# Patient Record
Sex: MALE | Race: WHITE | NOT HISPANIC OR LATINO
[De-identification: names, ages, dates, MRNs, and addresses within clinical notes are randomized per-mention and may not be internally consistent; named-entity substitution may affect disease eponyms.]

---

## 2018-11-28 ENCOUNTER — RECORD ABSTRACTING (OUTPATIENT)
Age: 63
End: 2018-11-28

## 2018-11-28 DIAGNOSIS — Z86.010 PERSONAL HISTORY OF COLONIC POLYPS: ICD-10-CM

## 2018-11-28 DIAGNOSIS — R74.8 ABNORMAL LEVELS OF OTHER SERUM ENZYMES: ICD-10-CM

## 2018-11-28 DIAGNOSIS — Z87.39 PERSONAL HISTORY OF OTHER DISEASES OF THE MUSCULOSKELETAL SYSTEM AND CONNECTIVE TISSUE: ICD-10-CM

## 2018-11-28 DIAGNOSIS — Z80.0 FAMILY HISTORY OF MALIGNANT NEOPLASM OF DIGESTIVE ORGANS: ICD-10-CM

## 2018-11-28 DIAGNOSIS — Z78.9 OTHER SPECIFIED HEALTH STATUS: ICD-10-CM

## 2018-11-28 DIAGNOSIS — Z82.49 FAMILY HISTORY OF ISCHEMIC HEART DISEASE AND OTHER DISEASES OF THE CIRCULATORY SYSTEM: ICD-10-CM

## 2018-11-28 DIAGNOSIS — Z86.19 PERSONAL HISTORY OF OTHER INFECTIOUS AND PARASITIC DISEASES: ICD-10-CM

## 2018-11-28 PROBLEM — Z00.00 ENCOUNTER FOR PREVENTIVE HEALTH EXAMINATION: Status: ACTIVE | Noted: 2018-11-28

## 2018-12-04 ENCOUNTER — APPOINTMENT (OUTPATIENT)
Dept: CARDIOLOGY | Facility: CLINIC | Age: 63
End: 2018-12-04
Payer: OTHER GOVERNMENT

## 2018-12-04 ENCOUNTER — NON-APPOINTMENT (OUTPATIENT)
Age: 63
End: 2018-12-04

## 2018-12-04 VITALS
SYSTOLIC BLOOD PRESSURE: 115 MMHG | BODY MASS INDEX: 28.88 KG/M2 | DIASTOLIC BLOOD PRESSURE: 70 MMHG | WEIGHT: 184 LBS | HEIGHT: 67 IN

## 2018-12-04 PROCEDURE — 93000 ELECTROCARDIOGRAM COMPLETE: CPT

## 2018-12-04 PROCEDURE — 99214 OFFICE O/P EST MOD 30 MIN: CPT

## 2018-12-04 NOTE — PHYSICAL EXAM
[General Appearance - Well Developed] : well developed [Normal Appearance] : normal appearance [Well Groomed] : well groomed [General Appearance - Well Nourished] : well nourished [No Deformities] : no deformities [General Appearance - In No Acute Distress] : no acute distress [Normal Conjunctiva] : the conjunctiva exhibited no abnormalities [Eyelids - No Xanthelasma] : the eyelids demonstrated no xanthelasmas [Normal Oral Mucosa] : normal oral mucosa [No Oral Pallor] : no oral pallor [No Oral Cyanosis] : no oral cyanosis [Normal Jugular Venous A Waves Present] : normal jugular venous A waves present [Normal Jugular Venous V Waves Present] : normal jugular venous V waves present [No Jugular Venous Jamison A Waves] : no jugular venous jamison A waves [Respiration, Rhythm And Depth] : normal respiratory rhythm and effort [Exaggerated Use Of Accessory Muscles For Inspiration] : no accessory muscle use [Auscultation Breath Sounds / Voice Sounds] : lungs were clear to auscultation bilaterally [Heart Rate And Rhythm] : heart rate and rhythm were normal [Heart Sounds] : normal S1 and S2 [Murmurs] : no murmurs present [Abdomen Soft] : soft [Abdomen Tenderness] : non-tender [Abdomen Mass (___ Cm)] : no abdominal mass palpated [Abnormal Walk] : normal gait [Gait - Sufficient For Exercise Testing] : the gait was sufficient for exercise testing [Nail Clubbing] : no clubbing of the fingernails [Cyanosis, Localized] : no localized cyanosis [Petechial Hemorrhages (___cm)] : no petechial hemorrhages [Skin Color & Pigmentation] : normal skin color and pigmentation [] : no rash [No Venous Stasis] : no venous stasis [Skin Lesions] : no skin lesions [No Skin Ulcers] : no skin ulcer [No Xanthoma] : no  xanthoma was observed [Oriented To Time, Place, And Person] : oriented to person, place, and time [Affect] : the affect was normal [Mood] : the mood was normal [No Anxiety] : not feeling anxious

## 2018-12-04 NOTE — HISTORY OF PRESENT ILLNESS
[FreeTextEntry1] : The patient has lately been experiencing symptoms of Lyme disease. These symptoms consist of a tingling sensation, fatigue, and occasional palpitations. There have been no symptoms of chest pain dizziness or lightheadedness. The patient is on an oral antibiotic prescribed by his primary care physician.

## 2018-12-04 NOTE — ASSESSMENT
[FreeTextEntry1] : The patient's examination today is normal. His electrocardiogram done today is normal. I have reviewed the EKG done recently at Dr. Alba office which I believe is also normal. In light of the patient's palpitations and diagnosis of Lyme disease I'm going to do a 24-hour Holter monitor to rule out arrhythmias and heart block. Current medications will be continued at this time.

## 2018-12-04 NOTE — REASON FOR VISIT
[FreeTextEntry1] : The patient was referred for evaluation by Dr. Adolfo DOSHI. He was recently found to have some abnormality on his EKG. Also the patient is undergoing treatment for recurrent Lyme disease. There was no recent rash or known tick bite. However the patient has had typical symptoms of the disease which he is familiar with him including tingling and joint pain muscle pain fatigue et cetera. His cardiac examination is normal today's electrocardiogram done here at my office was normal. The patient does complain of occasional palpitations. He has had this on a chronic basis but in order to rule out any arrhythmias I plan to do a 24-hour Holter monitor. We're also obtaining the previous EKG in question.

## 2020-02-27 ENCOUNTER — RESULT REVIEW (OUTPATIENT)
Age: 65
End: 2020-02-27

## 2020-02-28 ENCOUNTER — APPOINTMENT (OUTPATIENT)
Dept: GASTROENTEROLOGY | Facility: HOSPITAL | Age: 65
End: 2020-02-28

## 2021-01-08 ENCOUNTER — APPOINTMENT (OUTPATIENT)
Dept: CARDIOLOGY | Facility: CLINIC | Age: 66
End: 2021-01-08
Payer: OTHER GOVERNMENT

## 2021-01-08 ENCOUNTER — NON-APPOINTMENT (OUTPATIENT)
Age: 66
End: 2021-01-08

## 2021-01-08 VITALS
HEART RATE: 80 BPM | DIASTOLIC BLOOD PRESSURE: 80 MMHG | WEIGHT: 171 LBS | HEIGHT: 67 IN | BODY MASS INDEX: 26.84 KG/M2 | SYSTOLIC BLOOD PRESSURE: 124 MMHG

## 2021-01-08 PROCEDURE — 93000 ELECTROCARDIOGRAM COMPLETE: CPT

## 2021-01-08 PROCEDURE — 99072 ADDL SUPL MATRL&STAF TM PHE: CPT

## 2021-01-08 PROCEDURE — 99214 OFFICE O/P EST MOD 30 MIN: CPT

## 2021-01-08 RX ORDER — CETIRIZINE HYDROCHLORIDE 10 MG/1
10 TABLET, FILM COATED ORAL
Refills: 0 | Status: DISCONTINUED | COMMUNITY
End: 2021-01-08

## 2021-01-08 NOTE — REASON FOR VISIT
[FreeTextEntry1] : The patient experienced vague episode of extreme vertigo and October of 2020. He underwent a very thorough neurologic evaluation which revealed no evidence of infarction and no evidence of vascular obstruction. The symptoms have subsided. Patient does also complain of tinnitus. He is tearful and positioning his head so as to avoid symptoms.

## 2021-01-08 NOTE — HISTORY OF PRESENT ILLNESS
[FreeTextEntry1] : The patient's blood pressure was elevated at the time of the neural neurologic incident and has remained somewhat elevated it afterward. The patient was given a therapy including amlodipine but this lately caused a reaction and edema. I elected to switch the patient from amlodipine to losartan 25 mg daily.

## 2021-01-08 NOTE — DISCUSSION/SUMMARY
[FreeTextEntry1] : The patient's exam today is normal his electrocardiogram is normal. Recent labs were reviewed. Recent neurologic symptom of vertigo may have been a vestibular disorder rather than a central nervous system infarction. I am recommending that the patient evaluate this further at the [facility]. I believe his cardiac status is stable at the present time. We will continue to follow the patient's progress.

## 2021-08-31 ENCOUNTER — APPOINTMENT (OUTPATIENT)
Dept: GASTROENTEROLOGY | Facility: CLINIC | Age: 66
End: 2021-08-31
Payer: OTHER GOVERNMENT

## 2021-08-31 ENCOUNTER — NON-APPOINTMENT (OUTPATIENT)
Age: 66
End: 2021-08-31

## 2021-08-31 VITALS
DIASTOLIC BLOOD PRESSURE: 72 MMHG | SYSTOLIC BLOOD PRESSURE: 128 MMHG | HEIGHT: 67 IN | BODY MASS INDEX: 27.94 KG/M2 | HEART RATE: 72 BPM | TEMPERATURE: 97 F | WEIGHT: 178 LBS

## 2021-08-31 PROCEDURE — 99072 ADDL SUPL MATRL&STAF TM PHE: CPT

## 2021-08-31 PROCEDURE — 99214 OFFICE O/P EST MOD 30 MIN: CPT

## 2021-08-31 RX ORDER — METOPROLOL TARTRATE AND HYDROCHLOROTHIAZIDE 50; 25 MG/1; MG/1
50-25 TABLET ORAL DAILY
Refills: 0 | Status: DISCONTINUED | COMMUNITY
End: 2021-08-31

## 2021-08-31 RX ORDER — LOSARTAN POTASSIUM 25 MG/1
25 TABLET, FILM COATED ORAL
Qty: 90 | Refills: 3 | Status: DISCONTINUED | COMMUNITY
Start: 2021-01-07 | End: 2021-08-31

## 2021-08-31 NOTE — HISTORY OF PRESENT ILLNESS
[FreeTextEntry1] : 1.  patient, who had COVID in Feb 2021, left her with Bruxism, and perhaps some clicking when he swallows\par \par but his main symptoms bringing him into the office include;\par a.  upper abdominal burning, radiating into chest,\par patient has not responded to a number of antisecretory...\par tried zegerid, tried large amount of gaviscon\par \par avoids esophageal irritants, spicy foods, acidic foods, and there is sensitivity to a wide variety of esophageal irritants.\par \par he uses local gaviscon, and gaviscon advance...\par \par patient hasn’t found any particular regimen that is most helpful\par \par bed at 10 degreessx \par \par sx are worse after he bends over..\par \par and he can regurgitate at night\par \par barking cough for six weeks after covid, and then since, a clicking motion or sound when he swallows, and when he moves the hyoid bone to the right, there is a click\par \par

## 2021-08-31 NOTE — ASSESSMENT
[FreeTextEntry1] : 1.  gastroesophageal reflux, classic, \par \par 2.  family history of colon cancer\par \par brother had metastatic colon cancer\par \par 3.  history of adenomatous polyp, sigmoid colon, some five years ago\par \par plan\par needs a colonoscopy\par consider an egd as well\par patient is having perineal pain extending into left posterior upper thigh\par \par has not used ppi therapy regularly\par \par since June\par \par also, has the pharyngeal concerns, related to COVID, perhaps LPR\par and quite a bit of coughing\par \par with rectal bleed, anal pain, fh of colon can ,his brother, and tubular adenoma five years ago at last colonoscopy, the performance of a colonoscopy is essential\par \par i would add egd as well with the above symptoms, especially since this is a time when you are not using ppi therapy\par \par patient needs to have a bone density study, for ppi use off and on for many years\par \par osteoporosis is the most common side effect of long term ppi use\par \par also, yearly or semi annual bun and creatinine should be done\par \par More than 50% of the face to face time was devoted to counseling and /or coordination of care.  THis coordination of care may have included reviewing other medical notes and reports, and communicating with other health professionals\par \par AS WE OBTAIN INFORMED CONSENT FOR COLONOSCOPY, UPPER ENDOSCOPY [EGD], OR BOTH PROCEDURES TOGETHER;\par \par As with all procedures, there are risks of which the patient should be aware\par \par 1.  Anesthesia; deep sedation with Propofol;  there is a small risk of aspiration and pulmonary infection.  The Anesthesiologist meets with the patient the morning of the procedure to discuss in more detail\par \par 2.  risk of bleeding; from removal of a polyp, or rarely, from biopsies, 1 % or less\par \par 3.  risk of injury or perforation of the colon or upper GI tract; one in a thousand or less,  from removing a polyp or from advancing the instrument\par \par \par \par

## 2021-09-05 ENCOUNTER — RESULT REVIEW (OUTPATIENT)
Age: 66
End: 2021-09-05

## 2021-09-07 ENCOUNTER — RESULT REVIEW (OUTPATIENT)
Age: 66
End: 2021-09-07

## 2021-09-08 ENCOUNTER — APPOINTMENT (OUTPATIENT)
Dept: GASTROENTEROLOGY | Facility: HOSPITAL | Age: 66
End: 2021-09-08

## 2021-09-08 ENCOUNTER — NON-APPOINTMENT (OUTPATIENT)
Age: 66
End: 2021-09-08

## 2021-12-15 ENCOUNTER — TRANSCRIPTION ENCOUNTER (OUTPATIENT)
Age: 66
End: 2021-12-15

## 2022-01-27 RX ORDER — CETIRIZINE HCL 10 MG
10 TABLET ORAL
Refills: 0 | Status: ACTIVE | COMMUNITY

## 2022-01-27 RX ORDER — ALUMINUM HYDROXIDE AND MAGNESIUM CARBONATE 254; 237.5 MG/5ML; MG/5ML
254-237.5 LIQUID ORAL
Refills: 0 | Status: ACTIVE | COMMUNITY
Start: 2022-01-27

## 2022-01-27 RX ORDER — CALCIUM CARBONATE 1000 MG/1
1000 TABLET, CHEWABLE ORAL
Refills: 0 | Status: ACTIVE | COMMUNITY
Start: 2022-01-27

## 2022-02-23 NOTE — REASON FOR VISIT
[FreeTextEntry1] : Edwin Interiano presents for follow up visit. he was started on amlodipine 5mg daily one month ago. He reports

## 2022-02-25 ENCOUNTER — APPOINTMENT (OUTPATIENT)
Dept: CARDIOLOGY | Facility: CLINIC | Age: 67
End: 2022-02-25

## 2022-03-15 ENCOUNTER — APPOINTMENT (OUTPATIENT)
Dept: GASTROENTEROLOGY | Facility: CLINIC | Age: 67
End: 2022-03-15
Payer: OTHER GOVERNMENT

## 2022-03-15 PROCEDURE — 99443: CPT

## 2022-03-15 NOTE — HISTORY OF PRESENT ILLNESS
[FreeTextEntry1] : audio visit, consent on file , telephonic, his wife is on call\par patient at home, i am in my [place] office\par \par 1.  esophageal reflux\par he has been avoiding his PPI therapy, as per our discussion\par He did have a bone density, reported as normal\par his bp has been higher\par he did have an MRI of the abdomen, which showed some fatty change in liver, but lfts are normal, and the reading is mild steatosis\par \par meanwhile, his reflux regimen;\par \par no famotidine\par no ppi\par gaviscon and antacids\par he has some regurgitation if he bends over after a meal\par cant go to bed within a couple hours of eating\par \par no sensitivity to esophageal irritants..\par tomato sauce can cause some symptoms;  peppers and onion cause some gas, and heartburn\par caffeine..avoided, avoids alcohol, allergic o sulfites\par \par he does have heartburn two or three times per week\par he successfully lost weight, about twenty one pounds the last yeat\par \par p

## 2022-03-15 NOTE — ASSESSMENT
[FreeTextEntry1] : 1.  re mild steatosis, with nl lft's, not a great source of concern for me right now;  suggest every six months or so, just checking the liver chemistries\par 2.  yes, i do believe we can render the reflux better..how about trying pantoprazole every other morning, for the next six months\par and on the off days, a famotidine, in the morning\par gaviscon liberally when needed\par 3.  fu with dr Manuel re elevation of bp, and getting bp under control..and lipid management if the diet fails to get lipids under control\par \par rv in six months, or another visit like this\par \par AS WE OBTAIN INFORMED CONSENT FOR COLONOSCOPY, UPPER ENDOSCOPY [EGD], OR BOTH PROCEDURES TOGETHER;\par \par As with all procedures, there are risks of which the patient should be aware\par \par 1.  Anesthesia; deep sedation with Propofol;  there is a small risk of aspiration and pulmonary infection.  The Anesthesiologist meets with the patient the morning of the procedure to discuss in more detail\par \par 2.  risk of bleeding; from removal of a polyp, or rarely, from biopsies, 1 % or less\par \par 3.  risk of injury or perforation of the colon or upper GI tract; one in a thousand or less,  from removing a polyp or from advancing the instrument\par \par \par

## 2022-03-22 ENCOUNTER — NON-APPOINTMENT (OUTPATIENT)
Age: 67
End: 2022-03-22

## 2022-03-22 ENCOUNTER — APPOINTMENT (OUTPATIENT)
Dept: CARDIOLOGY | Facility: CLINIC | Age: 67
End: 2022-03-22
Payer: OTHER GOVERNMENT

## 2022-03-22 VITALS
BODY MASS INDEX: 28.09 KG/M2 | DIASTOLIC BLOOD PRESSURE: 76 MMHG | HEIGHT: 67 IN | SYSTOLIC BLOOD PRESSURE: 145 MMHG | WEIGHT: 179 LBS | TEMPERATURE: 97 F

## 2022-03-22 PROCEDURE — 99072 ADDL SUPL MATRL&STAF TM PHE: CPT

## 2022-03-22 PROCEDURE — 99214 OFFICE O/P EST MOD 30 MIN: CPT

## 2022-03-22 PROCEDURE — 93000 ELECTROCARDIOGRAM COMPLETE: CPT

## 2022-03-22 RX ORDER — IBUPROFEN 200 MG/1
200 TABLET ORAL
Refills: 0 | Status: DISCONTINUED | COMMUNITY
End: 2022-03-22

## 2022-03-22 NOTE — DISCUSSION/SUMMARY
[FreeTextEntry1] : The patient has had a number of recent medical issues.  He developed an elevation in blood pressure and some degree of tachycardia several months ago for unclear reasons.  He has been undergoing a very extensive evaluation including a series of MRIs and blood test etc.  The test so far have failed to disclose a cause of this problem however a test of interest is still pending this is the urine analysis for VMA and metanephrines.  There was no evidence of an adrenal tumor on MRI of the abdomen.  He was advised to have another MRI looking for a pituitary adenoma however I recommended that the patient should consult with an endocrinologist before this is performed.  His lipid profile was not optimal with an elevated LDL and non-HDL cholesterol therefore I am planning to prescribe rosuvastatin 10 mg at this time.  The patient's blood pressure has been under better control with an adjustment in his medications.  Diet and exercise would also be advisable.  The electrocardiography reveals sinus rhythm and is within normal limits.\par Plan to do a treadmill test in several months.

## 2022-03-22 NOTE — REASON FOR VISIT
[FreeTextEntry1] : Patient returns for follow-up today.  He has been followed with a diagnosis of hypertension and several months ago he noticed a spike in his blood pressure which was treated with additional antihypertensive medications.  At around the same time he noted symptoms of a prominent tremor especially involving the hands.  Patient consulted a neurologist who orchestrated a very extensive work-up including a several blood tests as well as MRI imaging studies.  There was a concern about a possible pituitary adenoma causing the symptoms however this diagnosis could not be confirmed on the MRIs due to technical limitations.  Another MRI of the abdomen revealed mild hepatic steatosis and no evidence of an adrenal mass.  The patient's lipid profile is as follows cholesterol 224 HDL 57 triglycerides 120  non-.  Remainder of the chemistry and CBC are normal.  Sh 1.63 ammonia 24 ESR 18 result a.m. 16.3 Free T4 1.0

## 2022-06-28 ENCOUNTER — APPOINTMENT (OUTPATIENT)
Dept: CARDIOLOGY | Facility: CLINIC | Age: 67
End: 2022-06-28
Payer: OTHER GOVERNMENT

## 2022-06-28 PROCEDURE — 99213 OFFICE O/P EST LOW 20 MIN: CPT | Mod: 25

## 2022-06-28 PROCEDURE — 93015 CV STRESS TEST SUPVJ I&R: CPT

## 2022-06-28 PROCEDURE — 99072 ADDL SUPL MATRL&STAF TM PHE: CPT

## 2022-06-28 RX ORDER — AMLODIPINE BESYLATE 5 MG/1
5 TABLET ORAL
Qty: 90 | Refills: 3 | Status: DISCONTINUED | COMMUNITY
Start: 2022-01-27 | End: 2022-06-28

## 2022-06-28 NOTE — REASON FOR VISIT
[FreeTextEntry1] : Patient comes for follow-up including treadmill testing.  He had adverse reactions to both amlodipine and losartan which caused edema and a rash respectively.\par And also we were able to reduce the metoprolol to 25 mg daily.  Even with this reduced regimen the patient's blood pressure readings have come under very good control typical reading is about 110/70.\par Patient has had no symptoms of chest pain shortness of breath or palpitations and in fact he walks up to 8000 steps on a given day.

## 2022-06-28 NOTE — DISCUSSION/SUMMARY
[FreeTextEntry1] : The patient's clinical condition is stable.  Blood pressure has come under very good control with a reduced regimen.\par Also the patient is benefiting from an increase program of walking exercise.  Today's treadmill test revealed no evidence of exercise-induced myocardial ischemia or arrhythmias and the patient was easily able to complete Boogie stage II.  I believe his cardiovascular status is stable I plan to continue the current regimen.

## 2022-11-21 ENCOUNTER — NON-APPOINTMENT (OUTPATIENT)
Age: 67
End: 2022-11-21

## 2022-11-21 RX ORDER — METOPROLOL SUCCINATE 25 MG/1
25 TABLET, EXTENDED RELEASE ORAL DAILY
Qty: 90 | Refills: 3 | Status: DISCONTINUED | COMMUNITY
Start: 2022-06-28 | End: 2022-11-21

## 2023-01-31 ENCOUNTER — NON-APPOINTMENT (OUTPATIENT)
Age: 68
End: 2023-01-31

## 2023-01-31 ENCOUNTER — APPOINTMENT (OUTPATIENT)
Dept: CARDIOLOGY | Facility: CLINIC | Age: 68
End: 2023-01-31
Payer: OTHER GOVERNMENT

## 2023-01-31 VITALS
OXYGEN SATURATION: 98 % | WEIGHT: 176 LBS | BODY MASS INDEX: 27.62 KG/M2 | SYSTOLIC BLOOD PRESSURE: 128 MMHG | HEART RATE: 70 BPM | TEMPERATURE: 97.8 F | HEIGHT: 67 IN | DIASTOLIC BLOOD PRESSURE: 76 MMHG

## 2023-01-31 PROCEDURE — 99072 ADDL SUPL MATRL&STAF TM PHE: CPT

## 2023-01-31 PROCEDURE — 93000 ELECTROCARDIOGRAM COMPLETE: CPT

## 2023-01-31 PROCEDURE — 99214 OFFICE O/P EST MOD 30 MIN: CPT

## 2023-01-31 RX ORDER — HYDROXYZINE PAMOATE 50 MG/1
50 CAPSULE ORAL
Qty: 120 | Refills: 0 | Status: DISCONTINUED | COMMUNITY
Start: 2022-06-18 | End: 2023-01-31

## 2023-01-31 RX ORDER — TRAMADOL HYDROCHLORIDE 300 MG/1
TABLET, EXTENDED RELEASE ORAL
Refills: 0 | Status: DISCONTINUED | COMMUNITY
End: 2023-01-31

## 2023-01-31 RX ORDER — HYDROCODONE BITARTRATE AND ACETAMINOPHEN 7.5; 3 MG/1; MG/1
TABLET ORAL
Refills: 0 | Status: DISCONTINUED | COMMUNITY
End: 2023-01-31

## 2023-01-31 NOTE — REVIEW OF SYSTEMS
[Fever] : no fever [Headache] : no headache [Chills] : no chills [Feeling Fatigued] : not feeling fatigued [SOB] : no shortness of breath [Chest Discomfort] : no chest discomfort [Lower Ext Edema] : no extremity edema [Palpitations] : no palpitations [Syncope] : no syncope [Dizziness] : no dizziness [Confusion] : no confusion was observed

## 2023-01-31 NOTE — REASON FOR VISIT
[Spouse] : spouse [FreeTextEntry1] : Edwin Interiano presents for follow up visit. \par \par Recent [facility] ED presentation on 1/25/23 with c/o elevated blood pressure and tachycardia. He was watching tv and felt warm. He checked his blood pressure, 180/100 and HR was 140. He took a 1/2 tablet of metoprolol succinate 50mg. HR did not improve and he presented to [facility]. \par \par Work up at [facility] - labs show no acute findings, ECG demonstrate sinus rhythm. he was discharged with instructions to follow up with cardiology. \par \par He was recently diagnosed with autonomic neuropathy by neurology, following with Dr. Rodriguez. He is being evaluated for hypoglycemia by endocrinologist Dr. Noble. He is wearing a glucometer. \par \par Mr. Interiano reports that his home blood pressure and heart rate have been within normal limits. He denies chest pain, SOB.

## 2023-01-31 NOTE — CARDIOLOGY SUMMARY
[de-identified] : 1/31/23 Sinus rhythm HR 71 [de-identified] : 12/4/2018 24hour Holter. Sinus rhythm. Average 74bpm. Single 4-beat SVT.  [de-identified] : 6/28/22 No ischemia. Boogie 6:41 minutes. 7 METS. 92% MPHR

## 2023-01-31 NOTE — DISCUSSION/SUMMARY
[Patient] : the patient [With ___] : with [unfilled] [EKG obtained to assist in diagnosis and management of assessed problem(s)] : EKG obtained to assist in diagnosis and management of assessed problem(s)

## 2023-01-31 NOTE — PHYSICAL EXAM
[No Acute Distress] : no acute distress [Normal S1, S2] : normal S1, S2 [No Murmur] : no murmur [Clear Lung Fields] : clear lung fields [Good Air Entry] : good air entry [No Respiratory Distress] : no respiratory distress  [Normal Gait] : normal gait [No Edema] : no edema [No Rash] : no rash [Moves all extremities] : moves all extremities [No Focal Deficits] : no focal deficits [Normal Speech] : normal speech [Alert and Oriented] : alert and oriented [Normal memory] : normal memory

## 2023-02-01 ENCOUNTER — RX CHANGE (OUTPATIENT)
Age: 68
End: 2023-02-01

## 2023-02-03 ENCOUNTER — RX CHANGE (OUTPATIENT)
Age: 68
End: 2023-02-03

## 2023-03-16 ENCOUNTER — NON-APPOINTMENT (OUTPATIENT)
Age: 68
End: 2023-03-16

## 2023-03-16 ENCOUNTER — APPOINTMENT (OUTPATIENT)
Dept: CARDIOLOGY | Facility: CLINIC | Age: 68
End: 2023-03-16
Payer: OTHER GOVERNMENT

## 2023-03-16 VITALS
SYSTOLIC BLOOD PRESSURE: 124 MMHG | DIASTOLIC BLOOD PRESSURE: 70 MMHG | HEIGHT: 67 IN | WEIGHT: 175 LBS | BODY MASS INDEX: 27.47 KG/M2

## 2023-03-16 PROCEDURE — 99072 ADDL SUPL MATRL&STAF TM PHE: CPT

## 2023-03-16 PROCEDURE — 99214 OFFICE O/P EST MOD 30 MIN: CPT

## 2023-03-16 PROCEDURE — 93000 ELECTROCARDIOGRAM COMPLETE: CPT

## 2023-03-16 NOTE — REVIEW OF SYSTEMS
[Negative] : Heme/Lymph [de-identified] : Patient has a diagnosis of autonomic neuropathy.  He is presently undergoing a repeat extensive evaluation by 2 different neurologists Dr. Rodriguez and Dr. Brown she will

## 2023-03-16 NOTE — REASON FOR VISIT
[FreeTextEntry1] : The patient returns for follow-up today.  Patient is being evaluated extensively for autonomic neuropathy.  This has resulted in a labile form of hypertension\par \par Also he is being evaluated by an endocrinologist because of tendency to symptoms of hypoglycemia he now has a indwelling glucose monitor the readings are generally in the normal range well above 60 however he describes symptoms that occur when the readings drop even into the upper 90s or 80s.  I am not sure of the cause of those symptoms but we will continue to evaluate this with his endocrinologist.  Lab tests were done earlier today.

## 2023-04-21 ENCOUNTER — APPOINTMENT (OUTPATIENT)
Dept: HEMATOLOGY ONCOLOGY | Facility: CLINIC | Age: 68
End: 2023-04-21
Payer: OTHER GOVERNMENT

## 2023-04-21 ENCOUNTER — RESULT REVIEW (OUTPATIENT)
Age: 68
End: 2023-04-21

## 2023-04-21 VITALS
WEIGHT: 185.19 LBS | OXYGEN SATURATION: 97 % | RESPIRATION RATE: 17 BRPM | SYSTOLIC BLOOD PRESSURE: 137 MMHG | HEIGHT: 67 IN | DIASTOLIC BLOOD PRESSURE: 75 MMHG | TEMPERATURE: 85 F | BODY MASS INDEX: 29.07 KG/M2 | HEART RATE: 78 BPM

## 2023-04-21 PROCEDURE — 99072 ADDL SUPL MATRL&STAF TM PHE: CPT

## 2023-04-21 PROCEDURE — 36415 COLL VENOUS BLD VENIPUNCTURE: CPT

## 2023-04-21 PROCEDURE — 99205 OFFICE O/P NEW HI 60 MIN: CPT | Mod: 25

## 2023-04-21 RX ORDER — FAMOTIDINE 20 MG/1
20 TABLET, FILM COATED ORAL
Qty: 90 | Refills: 3 | Status: COMPLETED | COMMUNITY
Start: 2022-03-15 | End: 2023-04-21

## 2023-04-21 RX ORDER — PANTOPRAZOLE 40 MG/1
40 TABLET, DELAYED RELEASE ORAL DAILY
Qty: 60 | Refills: 5 | Status: COMPLETED | COMMUNITY
Start: 2022-03-15 | End: 2023-04-21

## 2023-04-30 NOTE — HISTORY OF PRESENT ILLNESS
[de-identified] : Mr.Joseph Interiano is a 68 year old male who presents to the office for r/o amyloidosis \par \par Past hx: lyme disease, elevated liver enzymes, vertigo, covid, hypoglycemia ( dx), HTN, BPH, autonomous neuropathy\par Last labs from 23: WBC 9.98, neut%: 82, lymph 8.7\par PET scan performed on 3/23/23 to evaluate for an insulinoma or neural endocrine tumor was performed revealed the results to be negative. \par A follow up CT of the abdomen and pelvis on 4/3/23 to r/o amyloidosis was performed which was also negative at the time. \par \par Patient states that he has a hx of premature atrial contractions neither blood pressure and glucose are in directly control and he states which is due to autonomous neuropathy which was dx was .\par Patient had an EMG performed by  3-3 weeks ago and he was r/o for muscle dystrophy, ALS, no MS, Parkinson and myasthenia gravis\par He went to an endocrinologist who tested him on 2023 for c-peptide, cortisol-AM, insulin, metanephrines, proinsulin, T3, TSH, T4 Thyroid antibodies which states were all normal and negative. \par \par Social hx:\par Smoker: none\par ETOH: none \par Illicit Drugs: none\par Work: Retired  \par \par \par Family Hx:\par Brother: colon cancer dx 71, secondary to colon ca \par Father: heart attack  55, smoker. p Aunt and Uncle - , unknown age\par Paternal GF - unknown. \par Mother: COPD/emphysema (smoker) -  age 80\par MGF -  age 40 liver failure- ETOH \par Health maintenance:\par CNY/EGD: \par Prostate exam: \par

## 2023-04-30 NOTE — REVIEW OF SYSTEMS
[Night Sweats] : night sweats [Fatigue] : fatigue [Dry Eyes] : dryness of the eyes [Vision Problems] : vision problems [Muscle Weakness] : muscle weakness [Negative] : Allergic/Immunologic [FreeTextEntry3] : damage retina right eye  [de-identified] : vertigo

## 2023-04-30 NOTE — REASON FOR VISIT
[Initial Consultation] : an initial consultation [Spouse] : spouse [FreeTextEntry2] : evaluation for amyloidosis
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
## 2023-05-15 ENCOUNTER — APPOINTMENT (OUTPATIENT)
Dept: HEMATOLOGY ONCOLOGY | Facility: CLINIC | Age: 68
End: 2023-05-15
Payer: OTHER GOVERNMENT

## 2023-05-15 PROCEDURE — 99214 OFFICE O/P EST MOD 30 MIN: CPT | Mod: 95

## 2023-05-15 RX ORDER — LANCETS 30 GAUGE
EACH MISCELLANEOUS
Qty: 300 | Refills: 0 | Status: ACTIVE | COMMUNITY
Start: 2023-01-26

## 2023-05-15 RX ORDER — GLUCAGON INJECTION, SOLUTION 0.5 MG/.1ML
0.5 INJECTION, SOLUTION SUBCUTANEOUS
Qty: 1 | Refills: 0 | Status: ACTIVE | COMMUNITY
Start: 2023-02-22
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
## 2023-05-15 NOTE — REASON FOR VISIT
[Home] : at home, [unfilled] , at the time of the visit. [Medical Office: ([facility])___] : at the medical office located in  [Patient] : the patient [Self] : self [Spouse] : spouse [Initial Consultation] : an initial consultation [FreeTextEntry2] : evaluation for amyloidosis

## 2023-05-15 NOTE — REVIEW OF SYSTEMS
[Night Sweats] : night sweats [Fatigue] : fatigue [Dry Eyes] : dryness of the eyes [Vision Problems] : vision problems [Muscle Weakness] : muscle weakness [Negative] : Allergic/Immunologic [FreeTextEntry3] : damage retina right eye  [de-identified] : vertigo

## 2023-05-15 NOTE — HISTORY OF PRESENT ILLNESS
[de-identified] : Mr.Joseph Interiano is a 68 year old male who presents to the office for r/o amyloidosis \par \par Past hx: lyme disease, elevated liver enzymes, vertigo, covid, hypoglycemia ( dx), HTN, BPH, autonomous neuropathy\par Last labs from 23: WBC 9.98, neut%: 82, lymph 8.7\par PET scan performed on 3/23/23 to evaluate for an insulinoma or neural endocrine tumor was performed revealed the results to be negative. \par A follow up CT of the abdomen and pelvis on 4/3/23 to r/o amyloidosis was performed which was also negative at the time. \par \par Patient states that he has a hx of premature atrial contractions neither blood pressure and glucose are in directly control and he states which is due to autonomous neuropathy which was dx was .\par Patient had an EMG performed by  3-3 weeks ago and he was r/o for muscle dystrophy, ALS, no MS, Parkinson and myasthenia gravis\par He went to an endocrinologist who tested him on 2023 for c-peptide, cortisol-AM, insulin, metanephrines, proinsulin, T3, TSH, T4 Thyroid antibodies which states were all normal and negative. \par \par Social hx:\par Smoker: none\par ETOH: none \par Illicit Drugs: none\par Work: Retired  \par \par \par Family Hx:\par Brother: colon cancer dx 71, secondary to colon ca \par Father: heart attack  55, smoker. p Aunt and Uncle - , unknown age\par Paternal GF - unknown. \par Mother: COPD/emphysema (smoker) -  age 80\par MGF -  age 40 liver failure- ETOH \par Health maintenance:\par CNY/EGD: \par Prostate exam: \par

## 2023-06-22 ENCOUNTER — RESULT REVIEW (OUTPATIENT)
Age: 68
End: 2023-06-22

## 2023-08-01 ENCOUNTER — APPOINTMENT (OUTPATIENT)
Dept: SURGICAL ONCOLOGY | Facility: CLINIC | Age: 68
End: 2023-08-01
Payer: OTHER GOVERNMENT

## 2023-08-01 VITALS
RESPIRATION RATE: 16 BRPM | HEIGHT: 67 IN | DIASTOLIC BLOOD PRESSURE: 85 MMHG | HEART RATE: 76 BPM | WEIGHT: 185.8 LBS | SYSTOLIC BLOOD PRESSURE: 142 MMHG | BODY MASS INDEX: 29.16 KG/M2 | OXYGEN SATURATION: 97 %

## 2023-08-01 PROCEDURE — 99244 OFF/OP CNSLTJ NEW/EST MOD 40: CPT

## 2023-08-02 NOTE — HISTORY OF PRESENT ILLNESS
[de-identified] : Mr. Interiano is a 68 y.o. man who in the last few months has developed a number of symptoms suggestive of autonomic neuropathy, and most recently erratic blood pressure and blood glucose values. Dr. Culp believes that amyloidosis should be ruled out.  DOTATATE PET scan on 3/23/23 and a CT of the abdomen and pelvis on 4/3/23 have been negative for NET and amyloidosis, respectively. At this point a fat pad biopsy is necessary to r/o amyloidosis.

## 2023-08-02 NOTE — ASSESSMENT
[FreeTextEntry1] : Mr. Inteirano is a 68 y.o. man with autonomic neuropathy, possibly result of amyloidosis.  Will set up a fat pad biopsy tentatively for Aug 8. Discussed with Dr. Culp.

## 2023-08-02 NOTE — REVIEW OF SYSTEMS
[Negative] : Heme/Lymph [FreeTextEntry2] : chills, dizziness, sweats [FreeTextEntry5] : high BP, low BP, irregular heart beats (PAC), rapid heart beat [FreeTextEntry8] : bloating, bowel changes, gas, hemorrhoids, indigestion, nausea, heartburn [de-identified] : change in moles

## 2023-08-02 NOTE — REASON FOR VISIT
[Initial Consultation] : an initial consultation for [Spouse] : spouse [FreeTextEntry2] : autonomic neuropathy

## 2023-08-04 ENCOUNTER — RESULT REVIEW (OUTPATIENT)
Age: 68
End: 2023-08-04

## 2023-09-20 ENCOUNTER — APPOINTMENT (OUTPATIENT)
Dept: SURGERY | Facility: CLINIC | Age: 68
End: 2023-09-20
Payer: OTHER GOVERNMENT

## 2023-09-20 VITALS
HEIGHT: 67 IN | DIASTOLIC BLOOD PRESSURE: 85 MMHG | HEART RATE: 73 BPM | OXYGEN SATURATION: 97 % | SYSTOLIC BLOOD PRESSURE: 145 MMHG | TEMPERATURE: 98.4 F | BODY MASS INDEX: 29.03 KG/M2 | WEIGHT: 185 LBS

## 2023-09-20 PROCEDURE — 99213 OFFICE O/P EST LOW 20 MIN: CPT

## 2023-09-26 ENCOUNTER — NON-APPOINTMENT (OUTPATIENT)
Age: 68
End: 2023-09-26

## 2023-09-26 ENCOUNTER — APPOINTMENT (OUTPATIENT)
Dept: CARDIOLOGY | Facility: CLINIC | Age: 68
End: 2023-09-26
Payer: OTHER GOVERNMENT

## 2023-09-26 VITALS
OXYGEN SATURATION: 98 % | HEART RATE: 80 BPM | SYSTOLIC BLOOD PRESSURE: 128 MMHG | DIASTOLIC BLOOD PRESSURE: 72 MMHG | HEIGHT: 67 IN

## 2023-09-26 PROCEDURE — 99213 OFFICE O/P EST LOW 20 MIN: CPT | Mod: 25

## 2023-09-26 PROCEDURE — 93000 ELECTROCARDIOGRAM COMPLETE: CPT

## 2023-10-02 ENCOUNTER — RESULT REVIEW (OUTPATIENT)
Age: 68
End: 2023-10-02

## 2023-10-03 ENCOUNTER — APPOINTMENT (OUTPATIENT)
Dept: SURGERY | Facility: HOSPITAL | Age: 68
End: 2023-10-03

## 2023-10-03 ENCOUNTER — TRANSCRIPTION ENCOUNTER (OUTPATIENT)
Age: 68
End: 2023-10-03

## 2023-10-18 ENCOUNTER — APPOINTMENT (OUTPATIENT)
Dept: SURGERY | Facility: CLINIC | Age: 68
End: 2023-10-18
Payer: OTHER GOVERNMENT

## 2023-10-18 ENCOUNTER — APPOINTMENT (OUTPATIENT)
Dept: ENDOCRINOLOGY | Facility: CLINIC | Age: 68
End: 2023-10-18
Payer: OTHER GOVERNMENT

## 2023-10-18 VITALS
OXYGEN SATURATION: 96 % | SYSTOLIC BLOOD PRESSURE: 123 MMHG | HEART RATE: 64 BPM | TEMPERATURE: 98.3 F | DIASTOLIC BLOOD PRESSURE: 78 MMHG

## 2023-10-18 VITALS
HEIGHT: 67 IN | SYSTOLIC BLOOD PRESSURE: 116 MMHG | WEIGHT: 185 LBS | BODY MASS INDEX: 29.03 KG/M2 | OXYGEN SATURATION: 96 % | DIASTOLIC BLOOD PRESSURE: 70 MMHG | HEART RATE: 80 BPM

## 2023-10-18 PROCEDURE — 99024 POSTOP FOLLOW-UP VISIT: CPT

## 2023-10-18 PROCEDURE — 99204 OFFICE O/P NEW MOD 45 MIN: CPT

## 2023-10-18 RX ORDER — DIBASIC SODIUM PHOSPHATE, MONOBASIC POTASSIUM PHOSPHATE AND MONOBASIC SODIUM PHOSPHATE 852; 155; 130 MG/1; MG/1; MG/1
155-852-130 TABLET ORAL
Qty: 14 | Refills: 4 | Status: DISCONTINUED | COMMUNITY
Start: 2023-04-26 | End: 2023-10-18

## 2023-10-18 RX ORDER — METOPROLOL SUCCINATE 50 MG/1
50 TABLET, EXTENDED RELEASE ORAL TWICE DAILY
Qty: 90 | Refills: 3 | Status: DISCONTINUED | COMMUNITY
End: 2023-10-18

## 2023-10-18 RX ORDER — ALPRAZOLAM 0.5 MG/1
0.5 TABLET ORAL
Qty: 60 | Refills: 0 | Status: DISCONTINUED | COMMUNITY
Start: 2022-11-30 | End: 2023-10-18

## 2023-11-07 ENCOUNTER — RESULT REVIEW (OUTPATIENT)
Age: 68
End: 2023-11-07

## 2023-11-17 ENCOUNTER — RESULT REVIEW (OUTPATIENT)
Age: 68
End: 2023-11-17

## 2023-12-12 ENCOUNTER — APPOINTMENT (OUTPATIENT)
Dept: ENDOCRINOLOGY | Facility: CLINIC | Age: 68
End: 2023-12-12
Payer: OTHER GOVERNMENT

## 2023-12-12 VITALS
SYSTOLIC BLOOD PRESSURE: 118 MMHG | BODY MASS INDEX: 29.82 KG/M2 | DIASTOLIC BLOOD PRESSURE: 70 MMHG | HEIGHT: 67 IN | HEART RATE: 70 BPM | OXYGEN SATURATION: 98 % | WEIGHT: 190 LBS

## 2023-12-12 PROCEDURE — 99215 OFFICE O/P EST HI 40 MIN: CPT

## 2023-12-15 NOTE — HISTORY OF PRESENT ILLNESS
[FreeTextEntry1] : Dec 12, 2023    in person  PCP:  Dr. Adolfo Bailey           GI:  Dr. Darrel Fernandez           Neurology:  Dr. Russel Rodriguez            H/O:  Dr. Robbi Mireles:  Dr. Kg Manuel            Endo:  Dr. Stevenson            Derm:  Dr. James  - lesion on back  CC:   Abnormality of glucose tolerance with symptoms of hypoglycemia              4/21/23 phosphorus 2.1   67 yo visits because of symptoms of hypoglycemia.   He has had extensive, through evaluation for several issues and Dr. Stevenson has arranged for him to monitor his sugars with input from Sina (currently Libre3) which shows spikes in sugars after meals over 200 mg/dl on occasion and he develops symptoms of low sugar during the downward trajectory of sugar. He also underwent a formal 2 hour OGTT and developed symptoms at the 3rd hour.     He has a long history of autonomic neuropathy and gets light headed when he stands up.   He also has had episodes of hypertension and has been followed by Neurology and Cardiology.  Because of numbness and tingling in has hands and other symptoms, he is currently having the possibility of amyloidosis evaluated.  Imp:  Data on Sina 3 reviewed and confirms early diabetes. Plan:    Very low carb diet. It took a long time to convince him and his wife that croissants are not a part of a very low carb diet. His wife is very interested as to what to do if his sugars drop even on the very low carb diet.   Call me Sunday with data.    Oct 18, 2023   First visit - in person - accompanied by his wife (who reports she has DM)  PCP:  Dr. Adolfo Bailey           GI:  Dr. Darrel Fernandez           Neurology:  Dr. Russel Rodriguez            H/O:  Dr. Culp            Card:  Dr. Kg Guzman:  Dr. Stevenson            Derm:  Dr. James  - lesion on back  CC:   Abnormality of glucose tolerance with symptoms of hypoglycemia              4/21/23 phosphorus 2.1   67 yo visits because of symptoms of hypoglycemia.   He has had extensive, through evaluation for several issues and Dr. Stevenson has arranged for him to monitor his sugars with input from Sina (currently Libre3) which shows spikes in sugars after meals over 200 mg/dl on occasion and he develops symptoms of low sugar during the downward trajectory of sugar. He also underwent a formal 2 hour OGTT and developed symptoms at the 3rd hour.     He has a long history of autonomic neuropathy and gets light headed when he stands up.   He also has had episodes of hypertension and has been followed by Neurology and Cardiology.  Because of numbness and tingling in has hands and other symptoms, he is currently having the possibility of amyloidosis evaluated.  : : Constitutional:  Alert, well nourished, healthy appearance, no acute distress  Eyes:  No proptosis, no stare Thyroid: normal to palpation Pulmonary:  No respiratory distress, no accessory muscle use; normal rate and effort Cardiac:  Normal rate Vascular:  Endocrine:  No stigmata of Cushings Syndrome Musculoskeletal:  Normal gait, no involuntary movements Neurology:  No tremors Affect/Mood/Psych:  Oriented x 3; normal affect, normal insight/judgement, normal mood  .  Impression:  Patient kindly brought with him a careful outline of his recent history, symptoms and evaluation.  He has been evaluated by  Dermatology, GI, Neurology, Hematology, Cardiology and Endocrinology.   While he has a long history of apparent autonomic neuropathy, it is the symptoms of hypoglycemia that makes him eat, gain weight, and is of most concern to him.      Plan:  He has addressed the hypoglycemia with frequent small feedings and that has been associated with weight gain.   Another approach to hypoglycemia, especially if there is a reactive componenet is a very low carbohydrate diet and /or addition of medications such as Precose.  There appears to be a component of insulin resistance and medications to address this may also be useful. He has plans to follow up to Dermatology for a skin lesion on his back as well as GI.  He has an ongoing evaluation of possible amyloidosis in progress.       I have asked him to have overnight fasting insulin levels at [place] and a repeat 24 hour urine for catecholamines.    He will call me about two weeks after he completes these.

## 2024-01-09 ENCOUNTER — APPOINTMENT (OUTPATIENT)
Dept: ENDOCRINOLOGY | Facility: CLINIC | Age: 69
End: 2024-01-09
Payer: OTHER GOVERNMENT

## 2024-01-09 VITALS
BODY MASS INDEX: 28.56 KG/M2 | HEIGHT: 67 IN | SYSTOLIC BLOOD PRESSURE: 114 MMHG | WEIGHT: 182 LBS | OXYGEN SATURATION: 95 % | DIASTOLIC BLOOD PRESSURE: 62 MMHG | HEART RATE: 81 BPM

## 2024-01-09 DIAGNOSIS — Z86.39 PERSONAL HISTORY OF OTHER ENDOCRINE, NUTRITIONAL AND METABOLIC DISEASE: ICD-10-CM

## 2024-01-09 PROCEDURE — G2211 COMPLEX E/M VISIT ADD ON: CPT

## 2024-01-09 PROCEDURE — 99215 OFFICE O/P EST HI 40 MIN: CPT

## 2024-01-29 ENCOUNTER — NON-APPOINTMENT (OUTPATIENT)
Age: 69
End: 2024-01-29

## 2024-01-29 ENCOUNTER — APPOINTMENT (OUTPATIENT)
Dept: CARDIOLOGY | Facility: CLINIC | Age: 69
End: 2024-01-29
Payer: COMMERCIAL

## 2024-01-29 VITALS
BODY MASS INDEX: 28.93 KG/M2 | TEMPERATURE: 97.8 F | HEIGHT: 67 IN | HEART RATE: 72 BPM | RESPIRATION RATE: 18 BRPM | OXYGEN SATURATION: 98 % | SYSTOLIC BLOOD PRESSURE: 122 MMHG | WEIGHT: 184.31 LBS | DIASTOLIC BLOOD PRESSURE: 84 MMHG

## 2024-01-29 VITALS — SYSTOLIC BLOOD PRESSURE: 132 MMHG | DIASTOLIC BLOOD PRESSURE: 82 MMHG

## 2024-01-29 VITALS — DIASTOLIC BLOOD PRESSURE: 76 MMHG | SYSTOLIC BLOOD PRESSURE: 124 MMHG

## 2024-01-29 DIAGNOSIS — A69.20 LYME DISEASE, UNSPECIFIED: ICD-10-CM

## 2024-01-29 DIAGNOSIS — R42 DIZZINESS AND GIDDINESS: ICD-10-CM

## 2024-01-29 DIAGNOSIS — R19.7 DIARRHEA, UNSPECIFIED: ICD-10-CM

## 2024-01-29 DIAGNOSIS — Z86.16 PERSONAL HISTORY OF COVID-19: ICD-10-CM

## 2024-01-29 DIAGNOSIS — R00.2 PALPITATIONS: ICD-10-CM

## 2024-01-29 DIAGNOSIS — R79.89 OTHER SPECIFIED ABNORMAL FINDINGS OF BLOOD CHEMISTRY: ICD-10-CM

## 2024-01-29 DIAGNOSIS — Z86.2 PERSONAL HISTORY OF DISEASES OF THE BLOOD AND BLOOD-FORMING ORGANS AND CERTAIN DISORDERS INVOLVING THE IMMUNE MECHANISM: ICD-10-CM

## 2024-01-29 DIAGNOSIS — K76.0 FATTY (CHANGE OF) LIVER, NOT ELSEWHERE CLASSIFIED: ICD-10-CM

## 2024-01-29 DIAGNOSIS — E83.39 OTHER DISORDERS OF PHOSPHORUS METABOLISM: ICD-10-CM

## 2024-01-29 DIAGNOSIS — Z86.79 PERSONAL HISTORY OF OTHER DISEASES OF THE CIRCULATORY SYSTEM: ICD-10-CM

## 2024-01-29 DIAGNOSIS — Z86.39 PERSONAL HISTORY OF OTHER ENDOCRINE, NUTRITIONAL AND METABOLIC DISEASE: ICD-10-CM

## 2024-01-29 DIAGNOSIS — Z01.818 ENCOUNTER FOR OTHER PREPROCEDURAL EXAMINATION: ICD-10-CM

## 2024-01-29 DIAGNOSIS — K21.9 GASTRO-ESOPHAGEAL REFLUX DISEASE W/OUT ESOPHAGITIS: ICD-10-CM

## 2024-01-29 DIAGNOSIS — R97.20 ELEVATED PROSTATE, SPECIFIC ANTIGEN [PSA]: ICD-10-CM

## 2024-01-29 DIAGNOSIS — D86.3 SARCOIDOSIS OF SKIN: ICD-10-CM

## 2024-01-29 DIAGNOSIS — Z87.19 PERSONAL HISTORY OF OTHER DISEASES OF THE DIGESTIVE SYSTEM: ICD-10-CM

## 2024-01-29 DIAGNOSIS — K59.09 OTHER CONSTIPATION: ICD-10-CM

## 2024-01-29 DIAGNOSIS — I49.1 ATRIAL PREMATURE DEPOLARIZATION: ICD-10-CM

## 2024-01-29 DIAGNOSIS — E53.8 DEFICIENCY OF OTHER SPECIFIED B GROUP VITAMINS: ICD-10-CM

## 2024-01-29 LAB
ANION GAP SERPL CALC-SCNC: 11 MMOL/L
ANION GAP SERPL CALC-SCNC: 14 MMOL/L
BUN SERPL-MCNC: 12 MG/DL
BUN SERPL-MCNC: 12 MG/DL
C PEPTIDE SERPL-MCNC: 6.1 NG/ML
C PEPTIDE SERPL-MCNC: 6.3 NG/ML
CALCIUM SERPL-MCNC: 9.2 MG/DL
CALCIUM SERPL-MCNC: 9.5 MG/DL
CHLORIDE SERPL-SCNC: 103 MMOL/L
CHLORIDE SERPL-SCNC: 104 MMOL/L
CO2 SERPL-SCNC: 24 MMOL/L
CO2 SERPL-SCNC: 26 MMOL/L
CREAT SERPL-MCNC: 1 MG/DL
CREAT SERPL-MCNC: 1.1 MG/DL
EGFR: 73 ML/MIN/1.73M2
EGFR: 81 ML/MIN/1.73M2
GLUCOSE SERPL-MCNC: 102 MG/DL
GLUCOSE SERPL-MCNC: 119 MG/DL
INSULIN SERPL-MCNC: 39 UU/ML
INSULIN SERPL-MCNC: 42.2 UU/ML
POTASSIUM SERPL-SCNC: 4.5 MMOL/L
POTASSIUM SERPL-SCNC: 4.6 MMOL/L
PROINSULIN SERPL-MCNC: 14.7 PMOL/L
PROINSULIN SERPL-MCNC: 15.3 PMOL/L
SODIUM SERPL-SCNC: 140 MMOL/L
SODIUM SERPL-SCNC: 142 MMOL/L

## 2024-01-29 PROCEDURE — 99205 OFFICE O/P NEW HI 60 MIN: CPT | Mod: 25

## 2024-01-29 PROCEDURE — 93246 EXT ECG>7D<15D RECORDING: CPT

## 2024-01-29 PROCEDURE — 93000 ELECTROCARDIOGRAM COMPLETE: CPT | Mod: 59

## 2024-01-29 RX ORDER — ACARBOSE 25 MG/1
25 TABLET ORAL
Qty: 90 | Refills: 3 | Status: DISCONTINUED | COMMUNITY
Start: 2024-01-09 | End: 2024-01-29

## 2024-02-22 ENCOUNTER — LABORATORY RESULT (OUTPATIENT)
Age: 69
End: 2024-02-22

## 2024-02-22 ENCOUNTER — APPOINTMENT (OUTPATIENT)
Dept: FAMILY MEDICINE | Facility: CLINIC | Age: 69
End: 2024-02-22
Payer: OTHER GOVERNMENT

## 2024-02-22 VITALS
SYSTOLIC BLOOD PRESSURE: 120 MMHG | HEART RATE: 74 BPM | HEIGHT: 67 IN | OXYGEN SATURATION: 98 % | WEIGHT: 180 LBS | BODY MASS INDEX: 28.25 KG/M2 | DIASTOLIC BLOOD PRESSURE: 70 MMHG

## 2024-02-22 DIAGNOSIS — R53.83 OTHER FATIGUE: ICD-10-CM

## 2024-02-22 PROCEDURE — 36415 COLL VENOUS BLD VENIPUNCTURE: CPT

## 2024-02-22 PROCEDURE — 99203 OFFICE O/P NEW LOW 30 MIN: CPT

## 2024-02-22 PROCEDURE — G2211 COMPLEX E/M VISIT ADD ON: CPT

## 2024-02-26 PROCEDURE — 93248 EXT ECG>7D<15D REV&INTERPJ: CPT

## 2024-03-06 ENCOUNTER — MED ADMIN CHARGE (OUTPATIENT)
Age: 69
End: 2024-03-06

## 2024-03-06 ENCOUNTER — APPOINTMENT (OUTPATIENT)
Dept: INTERNAL MEDICINE | Facility: CLINIC | Age: 69
End: 2024-03-06
Payer: OTHER GOVERNMENT

## 2024-03-06 PROCEDURE — 96372 THER/PROPH/DIAG INJ SC/IM: CPT

## 2024-03-06 PROCEDURE — 36415 COLL VENOUS BLD VENIPUNCTURE: CPT

## 2024-03-06 RX ORDER — CYANOCOBALAMIN 1000 UG/ML
1000 INJECTION INTRAMUSCULAR; SUBCUTANEOUS
Qty: 0 | Refills: 0 | Status: COMPLETED | OUTPATIENT
Start: 2024-03-06

## 2024-03-10 ENCOUNTER — RESULT REVIEW (OUTPATIENT)
Age: 69
End: 2024-03-10

## 2024-03-11 DIAGNOSIS — R93.1 ABNORMAL FINDINGS ON DIAGNOSTIC IMAGING OF HEART AND CORONARY CIRCULATION: ICD-10-CM

## 2024-03-11 RX ORDER — ROSUVASTATIN CALCIUM 10 MG/1
10 TABLET, FILM COATED ORAL
Qty: 90 | Refills: 3 | Status: DISCONTINUED | COMMUNITY
Start: 2022-03-22 | End: 2023-01-31

## 2024-03-11 RX ADMIN — CYANOCOBALAMIN 0 MCG/ML: 1000 INJECTION, SOLUTION INTRAMUSCULAR at 00:00

## 2024-03-13 ENCOUNTER — APPOINTMENT (OUTPATIENT)
Dept: INTERNAL MEDICINE | Facility: CLINIC | Age: 69
End: 2024-03-13
Payer: COMMERCIAL

## 2024-03-13 PROCEDURE — 96372 THER/PROPH/DIAG INJ SC/IM: CPT

## 2024-03-13 RX ORDER — CYANOCOBALAMIN 1000 UG/ML
1000 INJECTION INTRAMUSCULAR; SUBCUTANEOUS
Qty: 0 | Refills: 0 | Status: COMPLETED | OUTPATIENT
Start: 2024-03-11

## 2024-03-17 RX ORDER — BLOOD SUGAR DIAGNOSTIC
STRIP MISCELLANEOUS
Qty: 100 | Refills: 11 | Status: ACTIVE | COMMUNITY
Start: 2023-04-24 | End: 1900-01-01

## 2024-03-20 ENCOUNTER — APPOINTMENT (OUTPATIENT)
Dept: INTERNAL MEDICINE | Facility: CLINIC | Age: 69
End: 2024-03-20
Payer: COMMERCIAL

## 2024-03-20 PROCEDURE — 96372 THER/PROPH/DIAG INJ SC/IM: CPT

## 2024-03-20 RX ORDER — CYANOCOBALAMIN 1000 UG/ML
1000 INJECTION INTRAMUSCULAR; SUBCUTANEOUS
Qty: 0 | Refills: 0 | Status: COMPLETED | OUTPATIENT
Start: 2024-03-14

## 2024-03-27 ENCOUNTER — APPOINTMENT (OUTPATIENT)
Dept: INTERNAL MEDICINE | Facility: CLINIC | Age: 69
End: 2024-03-27
Payer: OTHER GOVERNMENT

## 2024-03-27 PROCEDURE — 96372 THER/PROPH/DIAG INJ SC/IM: CPT

## 2024-03-27 RX ORDER — CYANOCOBALAMIN 1000 UG/ML
1000 INJECTION INTRAMUSCULAR; SUBCUTANEOUS
Qty: 0 | Refills: 0 | Status: COMPLETED | OUTPATIENT
Start: 2024-03-21

## 2024-04-02 ENCOUNTER — APPOINTMENT (OUTPATIENT)
Dept: CARDIOLOGY | Facility: CLINIC | Age: 69
End: 2024-04-02

## 2024-04-11 ENCOUNTER — APPOINTMENT (OUTPATIENT)
Dept: CARDIOLOGY | Facility: CLINIC | Age: 69
End: 2024-04-11

## 2024-04-12 ENCOUNTER — NON-APPOINTMENT (OUTPATIENT)
Age: 69
End: 2024-04-12

## 2024-04-24 ENCOUNTER — APPOINTMENT (OUTPATIENT)
Dept: INTERNAL MEDICINE | Facility: CLINIC | Age: 69
End: 2024-04-24
Payer: COMMERCIAL

## 2024-04-24 PROCEDURE — 96372 THER/PROPH/DIAG INJ SC/IM: CPT

## 2024-04-24 RX ORDER — CYANOCOBALAMIN 1000 UG/ML
1000 INJECTION INTRAMUSCULAR; SUBCUTANEOUS
Qty: 0 | Refills: 0 | Status: COMPLETED | OUTPATIENT
Start: 2024-04-23

## 2024-04-25 LAB
25(OH)D3 SERPL-MCNC: 19.2 NG/ML
ALBUMIN SERPL ELPH-MCNC: 4.3 G/DL
ALDOLASE SERPL-CCNC: 4.5 U/L
ALP BLD-CCNC: 117 U/L
ALT SERPL-CCNC: 19 U/L
ANA SER IF-ACNC: NEGATIVE
ANION GAP SERPL CALC-SCNC: 15 MMOL/L
AST SERPL-CCNC: 19 U/L
BASOPHILS # BLD AUTO: 0.04 K/UL
BASOPHILS NFR BLD AUTO: 0.4 %
BILIRUB SERPL-MCNC: 0.5 MG/DL
BUN SERPL-MCNC: 12 MG/DL
CALCIUM SERPL-MCNC: 9.2 MG/DL
CCP AB SER IA-ACNC: <8 UNITS
CELIACPAN: NORMAL
CHLORIDE SERPL-SCNC: 101 MMOL/L
CK SERPL-CCNC: 128 U/L
CO2 SERPL-SCNC: 25 MMOL/L
CREAT SERPL-MCNC: 0.91 MG/DL
CRP SERPL-MCNC: 9 MG/L
DSDNA AB SER-ACNC: <12 IU/ML
EGFR: 91 ML/MIN/1.73M2
EOSINOPHIL # BLD AUTO: 0.25 K/UL
EOSINOPHIL NFR BLD AUTO: 2.7 %
ERYTHROCYTE [SEDIMENTATION RATE] IN BLOOD BY WESTERGREN METHOD: 37 MM/HR
ESTIMATED AVERAGE GLUCOSE: 134 MG/DL
FERRITIN SERPL-MCNC: 444 NG/ML
FOLATE SERPL-MCNC: 11 NG/ML
FOLATE SERPL-MCNC: 14 NG/ML
FOLATE SERPL-MCNC: 14.2 NG/ML
GGT SERPL-CCNC: 211 U/L
GLUCOSE SERPL-MCNC: 89 MG/DL
HBA1C MFR BLD HPLC: 6.3 %
HCT VFR BLD CALC: 45.8 %
HCYS SERPL-MCNC: 15.3 UMOL/L
HGB BLD-MCNC: 15.4 G/DL
IF BLOCK AB SER QL: 1.1 AU/ML
IMM GRANULOCYTES NFR BLD AUTO: 0.3 %
IRON SATN MFR SERPL: 40 %
IRON SERPL-MCNC: 119 UG/DL
LDH SERPL-CCNC: 180 U/L
LYMPHOCYTES # BLD AUTO: 1.92 K/UL
LYMPHOCYTES NFR BLD AUTO: 20.9 %
MAN DIFF?: NORMAL
MCHC RBC-ENTMCNC: 31.6 PG
MCHC RBC-ENTMCNC: 33.6 GM/DL
MCV RBC AUTO: 94 FL
METHYLMALONATE SERPL-SCNC: 205 NMOL/L
MONOCYTES # BLD AUTO: 0.98 K/UL
MONOCYTES NFR BLD AUTO: 10.7 %
NEUTROPHILS # BLD AUTO: 5.98 K/UL
NEUTROPHILS NFR BLD AUTO: 65 %
PLATELET # BLD AUTO: 267 K/UL
POTASSIUM SERPL-SCNC: 4.1 MMOL/L
PROT SERPL-MCNC: 7 G/DL
RBC # BLD: 4.87 M/UL
RBC # FLD: 13.4 %
RF+CCP IGG SER-IMP: NEGATIVE
RHEUMATOID FACT SER QL: <10 IU/ML
SODIUM SERPL-SCNC: 141 MMOL/L
TIBC SERPL-MCNC: 295 UG/DL
TSH SERPL-ACNC: 1.54 UIU/ML
UIBC SERPL-MCNC: 177 UG/DL
VIT B12 SERPL-MCNC: 229 PG/ML
VIT B12 SERPL-MCNC: 263 PG/ML
VIT B12 SERPL-MCNC: 529 PG/ML
WBC # FLD AUTO: 9.2 K/UL

## 2024-05-10 ENCOUNTER — APPOINTMENT (OUTPATIENT)
Dept: CARDIOLOGY | Facility: CLINIC | Age: 69
End: 2024-05-10

## 2024-05-10 ENCOUNTER — RX CHANGE (OUTPATIENT)
Age: 69
End: 2024-05-10

## 2024-05-20 RX ORDER — ROSUVASTATIN CALCIUM 10 MG/1
10 TABLET, FILM COATED ORAL
Qty: 90 | Refills: 3 | Status: COMPLETED | COMMUNITY
Start: 2024-03-11 | End: 2024-05-20

## 2024-05-20 RX ORDER — METOPROLOL TARTRATE 25 MG/1
25 TABLET, FILM COATED ORAL TWICE DAILY
Qty: 180 | Refills: 3 | Status: ACTIVE | COMMUNITY
Start: 2023-01-31 | End: 1900-01-01

## 2024-05-22 ENCOUNTER — APPOINTMENT (OUTPATIENT)
Dept: INTERNAL MEDICINE | Facility: CLINIC | Age: 69
End: 2024-05-22
Payer: COMMERCIAL

## 2024-05-22 PROCEDURE — 96372 THER/PROPH/DIAG INJ SC/IM: CPT

## 2024-05-22 RX ORDER — CYANOCOBALAMIN 1000 UG/ML
1000 INJECTION INTRAMUSCULAR; SUBCUTANEOUS
Qty: 0 | Refills: 0 | Status: COMPLETED | OUTPATIENT
Start: 2024-05-19

## 2024-05-27 PROBLEM — R53.83 FATIGUE: Status: ACTIVE | Noted: 2024-02-22

## 2024-05-27 NOTE — HEALTH RISK ASSESSMENT
[No] : No [No falls in past year] : Patient reported no falls in the past year [0] : 2) Feeling down, depressed, or hopeless: Not at all (0) [Never] : Never [[id number]] : 0

## 2024-06-20 ENCOUNTER — APPOINTMENT (OUTPATIENT)
Dept: FAMILY MEDICINE | Facility: CLINIC | Age: 69
End: 2024-06-20
Payer: OTHER GOVERNMENT

## 2024-06-20 VITALS
TEMPERATURE: 99.2 F | WEIGHT: 184 LBS | HEIGHT: 67 IN | BODY MASS INDEX: 28.88 KG/M2 | DIASTOLIC BLOOD PRESSURE: 70 MMHG | SYSTOLIC BLOOD PRESSURE: 140 MMHG | HEART RATE: 82 BPM | OXYGEN SATURATION: 96 %

## 2024-06-20 DIAGNOSIS — E16.2 HYPOGLYCEMIA, UNSPECIFIED: ICD-10-CM

## 2024-06-20 DIAGNOSIS — E55.9 VITAMIN D DEFICIENCY, UNSPECIFIED: ICD-10-CM

## 2024-06-20 DIAGNOSIS — E53.8 DEFICIENCY OF OTHER SPECIFIED B GROUP VITAMINS: ICD-10-CM

## 2024-06-20 DIAGNOSIS — I10 ESSENTIAL (PRIMARY) HYPERTENSION: ICD-10-CM

## 2024-06-20 DIAGNOSIS — E11.9 TYPE 2 DIABETES MELLITUS W/OUT COMPLICATIONS: ICD-10-CM

## 2024-06-20 DIAGNOSIS — G90.9 DISORDER OF THE AUTONOMIC NERVOUS SYSTEM, UNSPECIFIED: ICD-10-CM

## 2024-06-20 DIAGNOSIS — E78.5 HYPERLIPIDEMIA, UNSPECIFIED: ICD-10-CM

## 2024-06-20 PROCEDURE — G2211 COMPLEX E/M VISIT ADD ON: CPT

## 2024-06-20 PROCEDURE — 99214 OFFICE O/P EST MOD 30 MIN: CPT

## 2024-06-20 PROCEDURE — 36415 COLL VENOUS BLD VENIPUNCTURE: CPT

## 2024-06-20 RX ORDER — BLOOD-GLUCOSE SENSOR
EACH MISCELLANEOUS
Qty: 3 | Refills: 6 | Status: DISCONTINUED | COMMUNITY
Start: 2024-03-10 | End: 2024-06-20

## 2024-06-20 RX ORDER — ROSUVASTATIN CALCIUM 5 MG/1
5 TABLET, FILM COATED ORAL DAILY
Qty: 30 | Refills: 1 | Status: DISCONTINUED | COMMUNITY
Start: 2024-05-20 | End: 2024-06-20

## 2024-06-20 RX ORDER — BLOOD-GLUCOSE,RECEIVER,CONT
EACH MISCELLANEOUS
Qty: 1 | Refills: 0 | Status: DISCONTINUED | COMMUNITY
Start: 2024-03-10 | End: 2024-06-20

## 2024-06-20 RX ORDER — FLASH GLUCOSE SENSOR
KIT MISCELLANEOUS
Qty: 6 | Refills: 0 | Status: DISCONTINUED | COMMUNITY
Start: 2023-02-22 | End: 2024-06-20

## 2024-06-21 ENCOUNTER — APPOINTMENT (OUTPATIENT)
Dept: ORTHOPEDIC SURGERY | Facility: CLINIC | Age: 69
End: 2024-06-21
Payer: COMMERCIAL

## 2024-06-21 ENCOUNTER — RESULT REVIEW (OUTPATIENT)
Age: 69
End: 2024-06-21

## 2024-06-21 VITALS — HEIGHT: 67 IN | WEIGHT: 184 LBS | BODY MASS INDEX: 28.88 KG/M2

## 2024-06-21 DIAGNOSIS — M17.0 BILATERAL PRIMARY OSTEOARTHRITIS OF KNEE: ICD-10-CM

## 2024-06-21 DIAGNOSIS — S83.241A OTHER TEAR OF MEDIAL MENISCUS, CURRENT INJURY, RIGHT KNEE, INITIAL ENCOUNTER: ICD-10-CM

## 2024-06-21 LAB
25(OH)D3 SERPL-MCNC: 24.2 NG/ML
ALBUMIN SERPL ELPH-MCNC: 4.4 G/DL
ALP BLD-CCNC: 133 U/L
ALT SERPL-CCNC: 21 U/L
ANION GAP SERPL CALC-SCNC: 13 MMOL/L
AST SERPL-CCNC: 25 U/L
BASOPHILS # BLD AUTO: 0.04 K/UL
BASOPHILS NFR BLD AUTO: 0.4 %
BILIRUB SERPL-MCNC: 0.5 MG/DL
BUN SERPL-MCNC: 15 MG/DL
CALCIUM SERPL-MCNC: 9.4 MG/DL
CHLORIDE SERPL-SCNC: 102 MMOL/L
CO2 SERPL-SCNC: 24 MMOL/L
CREAT SERPL-MCNC: 1.04 MG/DL
EGFR: 77 ML/MIN/1.73M2
EOSINOPHIL # BLD AUTO: 0.24 K/UL
EOSINOPHIL NFR BLD AUTO: 2.6 %
ESTIMATED AVERAGE GLUCOSE: 131 MG/DL
FOLATE SERPL-MCNC: 13.5 NG/ML
GLUCOSE SERPL-MCNC: 95 MG/DL
HBA1C MFR BLD HPLC: 6.2 %
HCT VFR BLD CALC: 46.7 %
HCYS SERPL-MCNC: 13.7 UMOL/L
HGB BLD-MCNC: 15.8 G/DL
IMM GRANULOCYTES NFR BLD AUTO: 0.3 %
IRON SATN MFR SERPL: 44 %
IRON SERPL-MCNC: 136 UG/DL
LYMPHOCYTES # BLD AUTO: 1.96 K/UL
LYMPHOCYTES NFR BLD AUTO: 21 %
MAN DIFF?: NORMAL
MCHC RBC-ENTMCNC: 31.7 PG
MCHC RBC-ENTMCNC: 33.8 GM/DL
MCV RBC AUTO: 93.6 FL
MONOCYTES # BLD AUTO: 1.04 K/UL
MONOCYTES NFR BLD AUTO: 11.1 %
NEUTROPHILS # BLD AUTO: 6.03 K/UL
NEUTROPHILS NFR BLD AUTO: 64.6 %
PLATELET # BLD AUTO: 269 K/UL
POTASSIUM SERPL-SCNC: 4.9 MMOL/L
PROT SERPL-MCNC: 7.1 G/DL
RBC # BLD: 4.99 M/UL
RBC # FLD: 13.9 %
SODIUM SERPL-SCNC: 140 MMOL/L
TIBC SERPL-MCNC: 310 UG/DL
UIBC SERPL-MCNC: 174 UG/DL
VIT B12 SERPL-MCNC: 508 PG/ML
WBC # FLD AUTO: 9.34 K/UL

## 2024-06-21 PROCEDURE — 99203 OFFICE O/P NEW LOW 30 MIN: CPT

## 2024-06-21 NOTE — PHYSICAL EXAM
[de-identified] : Knee ranges 5-115 degrees with pain and crepitus stable to varus, valgus, anterior and posterior stress neurovascularly intact distally no pain with hip range of motion negative straight leg raise + effusion, no synovitis, or edema or erythema skin intact +henna +medial joint line ttp [de-identified] : weight bearing xrays show min oa

## 2024-06-21 NOTE — HISTORY OF PRESENT ILLNESS
[de-identified] : right knee known menisucus tear 2010 no surgery, left knee surgery at that time did well till 3 weeks ago twiseted right knee and now w worsening medial pain, left has been overworking and increasing as well multiple medical issues and prior adverse reaction to cortisone

## 2024-06-25 PROBLEM — I10 HTN (HYPERTENSION): Status: ACTIVE | Noted: 2021-01-07

## 2024-06-25 PROBLEM — E78.5 HYPERLIPIDEMIA: Status: ACTIVE | Noted: 2022-03-22

## 2024-06-25 PROBLEM — G90.9 AUTONOMIC DYSFUNCTION: Status: ACTIVE | Noted: 2023-03-16

## 2024-06-25 PROBLEM — E16.2 HYPOGLYCEMIA: Status: ACTIVE | Noted: 2024-01-09

## 2024-06-25 PROBLEM — E53.8 VITAMIN B12 DEFICIENCY: Status: ACTIVE | Noted: 2023-10-18

## 2024-06-25 PROBLEM — E55.9 VITAMIN D DEFICIENCY: Status: ACTIVE | Noted: 2023-10-18

## 2024-06-25 PROBLEM — E11.9 TYPE 2 DIABETES MELLITUS WITHOUT COMPLICATION, UNSPECIFIED WHETHER LONG TERM INSULIN USE: Status: ACTIVE | Noted: 2024-03-10

## 2024-06-25 NOTE — HISTORY OF PRESENT ILLNESS
[FreeTextEntry1] : f/u [de-identified] : 68 yo M with hx of autonomic dysfunction, HLD, b12 deficiency, vitamin D deficiency initially here for annual physical but declines, only wants to discuss b12, vitamin D and need for statin medication. Declines physical examination. Reports he was prescribed Crestor by cardiologist but refusing to take. We discussed his reasoning.

## 2024-06-25 NOTE — PLAN
[FreeTextEntry1] : Labs drawn in office DM2 monitoring with A1c today Will start monthly dosing of b12 injections as it has helped with his autonomic dysfunction Vitamin D levels checked Stressed importance of following cardiology recommendations regarding statin medications due to CT calcium score, elevated and encouraged to proceed with additional testing to evalute for CAD.

## 2024-06-26 LAB — METHYLMALONATE SERPL-SCNC: 154 NMOL/L

## 2024-06-27 ENCOUNTER — NON-APPOINTMENT (OUTPATIENT)
Age: 69
End: 2024-06-27

## 2024-07-15 ENCOUNTER — APPOINTMENT (OUTPATIENT)
Dept: INTERNAL MEDICINE | Facility: CLINIC | Age: 69
End: 2024-07-15
Payer: COMMERCIAL

## 2024-07-15 PROCEDURE — 96372 THER/PROPH/DIAG INJ SC/IM: CPT

## 2024-07-15 RX ORDER — CYANOCOBALAMIN 1000 UG/ML
1000 INJECTION INTRAMUSCULAR; SUBCUTANEOUS
Qty: 0 | Refills: 0 | Status: COMPLETED | OUTPATIENT
Start: 2024-07-15

## 2024-07-15 RX ORDER — ERGOCALCIFEROL 1.25 MG/1
1.25 MG CAPSULE, LIQUID FILLED ORAL
Qty: 16 | Refills: 1 | Status: ACTIVE | COMMUNITY
Start: 2024-07-15 | End: 1900-01-01

## 2024-07-21 PROBLEM — Z87.19 HISTORY OF CHRONIC CONSTIPATION: Status: RESOLVED | Noted: 2021-08-31 | Resolved: 2024-07-21

## 2024-07-21 PROBLEM — Z87.898 HISTORY OF DIARRHEA: Status: RESOLVED | Noted: 2018-11-28 | Resolved: 2024-07-21

## 2024-07-22 ENCOUNTER — APPOINTMENT (OUTPATIENT)
Dept: CARDIOLOGY | Facility: CLINIC | Age: 69
End: 2024-07-22
Payer: OTHER GOVERNMENT

## 2024-07-22 ENCOUNTER — NON-APPOINTMENT (OUTPATIENT)
Age: 69
End: 2024-07-22

## 2024-07-22 VITALS
TEMPERATURE: 97.7 F | WEIGHT: 185.06 LBS | RESPIRATION RATE: 18 BRPM | SYSTOLIC BLOOD PRESSURE: 114 MMHG | BODY MASS INDEX: 29.04 KG/M2 | HEIGHT: 67 IN | DIASTOLIC BLOOD PRESSURE: 78 MMHG | OXYGEN SATURATION: 98 % | HEART RATE: 74 BPM

## 2024-07-22 DIAGNOSIS — E78.5 HYPERLIPIDEMIA, UNSPECIFIED: ICD-10-CM

## 2024-07-22 DIAGNOSIS — Z87.898 PERSONAL HISTORY OF OTHER SPECIFIED CONDITIONS: ICD-10-CM

## 2024-07-22 DIAGNOSIS — I49.1 ATRIAL PREMATURE DEPOLARIZATION: ICD-10-CM

## 2024-07-22 DIAGNOSIS — Z86.79 PERSONAL HISTORY OF OTHER DISEASES OF THE CIRCULATORY SYSTEM: ICD-10-CM

## 2024-07-22 DIAGNOSIS — R00.2 PALPITATIONS: ICD-10-CM

## 2024-07-22 DIAGNOSIS — R93.1 ABNORMAL FINDINGS ON DIAGNOSTIC IMAGING OF HEART AND CORONARY CIRCULATION: ICD-10-CM

## 2024-07-22 DIAGNOSIS — I10 ESSENTIAL (PRIMARY) HYPERTENSION: ICD-10-CM

## 2024-07-22 PROCEDURE — 99215 OFFICE O/P EST HI 40 MIN: CPT

## 2024-07-22 PROCEDURE — 36415 COLL VENOUS BLD VENIPUNCTURE: CPT

## 2024-07-22 PROCEDURE — G2211 COMPLEX E/M VISIT ADD ON: CPT

## 2024-07-22 PROCEDURE — 93000 ELECTROCARDIOGRAM COMPLETE: CPT

## 2024-07-22 RX ORDER — CYANOCOBALAMIN, ISOPROPYL ALCOHOL 1000MCG/ML
1000 KIT INJECTION
Refills: 0 | Status: ACTIVE | COMMUNITY

## 2024-07-22 NOTE — HISTORY OF PRESENT ILLNESS
[FreeTextEntry1] :  Mr Interiano was  first seen 1/2024,  previously under the care of  Dr Manuel.   He was followed for hypertension.  All cardiac testing has been reviewed and is in general good, he did have a Holter that showed some PACs in 2018 and mild tricuspid insufficiency on a repeat since echo.  Stress testing was normal.  He was being considered for amyloidosis.  His fat pad biopsy was negative.  Genetic testing was also negative. Calcium score done March 2024 is 849.  There have been no hospitalizations since last visit. He denies chest pain, dyspnea, palpitations a skip, sometimes 4-6 in a row 1-2 times a month- or syncope.  HE is concerned about his "autonomic neuropathy" and pain and tingling in hands and feet and muscle cramping.  He has concerns that he has too much toxins/metals in his body and that this might affect his heart. He has many medication and substance intolerances.He did not yet start the rosuvastatin.

## 2024-07-22 NOTE — CARDIOLOGY SUMMARY
[de-identified] : 7/22/24-normal sinus rhythm [de-identified] : 1/2024-symptomatic pacs [de-identified] : June 2022 7 minutes 6 minutes 41 seconds, 92% maximal no evidence of exercise-induced ischemia [de-identified] : 6/23/2023-ejection fraction 60 to 65%, mild TR [de-identified] : 3/10/24- calcium score 849 lad 723 lcx 115 rca 11 martin 84%

## 2024-07-22 NOTE — CARDIOLOGY SUMMARY
[de-identified] : 7/22/24-normal sinus rhythm [de-identified] : 1/2024-symptomatic pacs [de-identified] : June 2022 7 minutes 6 minutes 41 seconds, 92% maximal no evidence of exercise-induced ischemia [de-identified] : 6/23/2023-ejection fraction 60 to 65%, mild TR [de-identified] : 3/10/24- calcium score 849 lad 723 lcx 115 rca 11 martin 84%

## 2024-07-23 ENCOUNTER — TRANSCRIPTION ENCOUNTER (OUTPATIENT)
Age: 69
End: 2024-07-23

## 2024-07-23 ENCOUNTER — RX RENEWAL (OUTPATIENT)
Age: 69
End: 2024-07-23

## 2024-07-23 LAB
ALBUMIN SERPL ELPH-MCNC: 4.4 G/DL
ALP BLD-CCNC: 135 U/L
ALT SERPL-CCNC: 22 U/L
ANION GAP SERPL CALC-SCNC: 13 MMOL/L
AST SERPL-CCNC: 24 U/L
BILIRUB SERPL-MCNC: 0.4 MG/DL
BUN SERPL-MCNC: 14 MG/DL
CALCIUM SERPL-MCNC: 9.6 MG/DL
CHLORIDE SERPL-SCNC: 101 MMOL/L
CHOLEST SERPL-MCNC: 217 MG/DL
CO2 SERPL-SCNC: 26 MMOL/L
CREAT SERPL-MCNC: 1.14 MG/DL
CRP SERPL HS-MCNC: 6.19 MG/L
EGFR: 69 ML/MIN/1.73M2
GLUCOSE SERPL-MCNC: 117 MG/DL
HDLC SERPL-MCNC: 46 MG/DL
LDLC SERPL CALC-MCNC: 122 MG/DL
NONHDLC SERPL-MCNC: 171 MG/DL
POTASSIUM SERPL-SCNC: 5 MMOL/L
PROT SERPL-MCNC: 7.3 G/DL
SODIUM SERPL-SCNC: 140 MMOL/L
TRIGL SERPL-MCNC: 283 MG/DL

## 2024-07-24 LAB — APO LP(A) SERPL-MCNC: 47 NMOL/L

## 2024-07-25 ENCOUNTER — APPOINTMENT (OUTPATIENT)
Dept: ORTHOPEDIC SURGERY | Facility: CLINIC | Age: 69
End: 2024-07-25

## 2024-07-30 ENCOUNTER — APPOINTMENT (OUTPATIENT)
Dept: ENDOCRINOLOGY | Facility: CLINIC | Age: 69
End: 2024-07-30
Payer: OTHER GOVERNMENT

## 2024-07-30 VITALS
OXYGEN SATURATION: 98 % | WEIGHT: 183 LBS | SYSTOLIC BLOOD PRESSURE: 120 MMHG | HEART RATE: 80 BPM | HEIGHT: 67 IN | BODY MASS INDEX: 28.72 KG/M2 | DIASTOLIC BLOOD PRESSURE: 84 MMHG

## 2024-07-30 DIAGNOSIS — E11.9 TYPE 2 DIABETES MELLITUS W/OUT COMPLICATIONS: ICD-10-CM

## 2024-07-30 DIAGNOSIS — E04.9 NONTOXIC GOITER, UNSPECIFIED: ICD-10-CM

## 2024-07-30 DIAGNOSIS — E16.2 HYPOGLYCEMIA, UNSPECIFIED: ICD-10-CM

## 2024-07-30 PROCEDURE — 99214 OFFICE O/P EST MOD 30 MIN: CPT

## 2024-08-02 NOTE — HISTORY OF PRESENT ILLNESS
[FreeTextEntry1] : Jul 30, 2024     in person accompanied by his wife   PCP:  Dr. Melissa Knapp              GI:  Dr. Darrel Fernandez           Neurology:  Dr. Russel Rodriguez            H/O:  Dr. Palma Culp            Card:  Dr. Pratibha Cardenas             Endo:  Dr. Stevenson            Derm:  Dr. James  - lesion on back  CC:   Abnormality of glucose tolerance with symptoms of hypoglycemia                                2/2024 A1c 6.3;  6/2024 A1c 6.2%                              insulin resistance (multiple skin tags - Dr. Ray)                4/21/23 phosphorus 2.1   70 yo visits because of symptoms of hypoglycemia.   He has had extensive, through evaluation for several issues and Dr. Stevenson has arranged for him to monitor his sugars with input from Sina (currently Libre3) which shows spikes in sugars after meals over 200 mg/dl on occasion and he develops symptoms of low sugar during the downward trajectory of sugar. He also underwent a formal 2 hour OGTT and developed symptoms at the 3rd hour.    He was told of autonomic neuropathy by Dr. Rodriguez and this limits his exercise. Recent A1c down to 6.1 Review of Sina 3 data shows essentially no hypoglycemia.  : : Constitutional:  Alert, well nourished, healthy appearance, no acute distress  Eyes:  No proptosis, no stare Thyroid: Pulmonary:  No respiratory distress, no accessory muscle use; normal rate and effort Cardiac:  Normal rate Vascular:  Endocrine:  No stigmata of Cushings Syndrome Musculoskeletal:  Normal gait, no involuntary movements Neurology:  No tremors Affect/Mood/Psych:  Oriented x 3; normal affect, normal insight/judgement, normal mood  . Impression:  Not a candidate for Precose b/o GERD per GI.b/o open LES.     Doing well on lower carb diet (although he had waffle, croissant this morning)      Plan:  Continue current strategy of low carbohydrate diet.         Jan 09, 2024     in person    PCP:  Dr. Adolfo Bailey           GI:  Dr. Darrel Fernandez           Neurology:  Dr. Russel Rodriguez            H/O:  Dr. Culp            Card:  Dr. Pratibha Cardenas     calcium score 849             Endo:  Dr. Stevenson            Derm:  Dr. James  - lesion on back  CC:   Abnormality of glucose tolerance with symptoms of hypoglycemia              4/21/23 phosphorus 2.1   69 yo visits because of symptoms of hypoglycemia.   He has had extensive, through evaluation for several issues and Dr. Stevenson has arranged for him to monitor his sugars with input from Sina (currently Libre3) which shows spikes in sugars after meals over 200 mg/dl on occasion and he develops symptoms of low sugar during the downward trajectory of sugar. He also underwent a formal 2 hour OGTT and developed symptoms at the 3rd hour.     : : Constitutional:  Alert, well nourished, healthy appearance, no acute distress  Eyes:  No proptosis, no stare Thyroid: Pulmonary:  No respiratory distress, no accessory muscle use; normal rate and effort Cardiac:  Normal rate Vascular:  Endocrine:  No stigmata of Cushings Syndrome Musculoskeletal:  Normal gait, no involuntary movements Neurology:  No tremors Affect/Mood/Psych:  Oriented x 3; normal affect, normal insight/judgement, normal mood  . Impression:  Available data strongly suggests that he is very sensitive to carbohydrates and the Sina data is "flat" when he avoids carbs.  Plan:  Very low carb diet and trial of Precose.      Dec 12, 2023    in person  PCP:  Dr. Adolfo Bailey           GI:  Dr. Darrel Fernandez           Neurology:  Dr. Russel Rodriguez            H/O:  Dr. Culp            Card:  Dr. Kg Manuel            Endo:  Dr. Stevenson            Derm:  Dr. James  - lesion on back  CC:   Abnormality of glucose tolerance with symptoms of hypoglycemia              4/21/23 phosphorus 2.1   69 yo visits because of symptoms of hypoglycemia.   He has had extensive, through evaluation for several issues and Dr. Stevenson has arranged for him to monitor his sugars with input from Sina (currently Libre3) which shows spikes in sugars after meals over 200 mg/dl on occasion and he develops symptoms of low sugar during the downward trajectory of sugar. He also underwent a formal 2 hour OGTT and developed symptoms at the 3rd hour.     He has a long history of autonomic neuropathy and gets light headed when he stands up.   He also has had episodes of hypertension and has been followed by Neurology and Cardiology.  Because of numbness and tingling in has hands and other symptoms, he is currently having the possibility of amyloidosis evaluated.  Imp:  Data on Sina 3 reviewed and confirms early diabetes. Plan:    Very low carb diet. It took a long time to convince him and his wife that croissants are not a part of a very low carb diet. His wife is very interested as to what to do if his sugars drop even on the very low carb diet.   Call me Sunday with data.    Oct 18, 2023   First visit - in person - accompanied by his wife (who reports she has DM)  PCP:  Dr. Adolfo Bailey           GI:  Dr. Darrel Fernandez           Neurology:  Dr. Russle Rodriguez            H/O:  Dr. Culp            Card:  Dr. Kg Manuel            Endo:  Dr. Stevenson            Derm:  Dr. James  - lesion on back  CC:   Abnormality of glucose tolerance with symptoms of hypoglycemia              4/21/23 phosphorus 2.1   69 yo visits because of symptoms of hypoglycemia.   He has had extensive, through evaluation for several issues and Dr. Stevenson has arranged for him to monitor his sugars with input from Sina (currently Libre3) which shows spikes in sugars after meals over 200 mg/dl on occasion and he develops symptoms of low sugar during the downward trajectory of sugar. He also underwent a formal 2 hour OGTT and developed symptoms at the 3rd hour.     He has a long history of autonomic neuropathy and gets light headed when he stands up.   He also has had episodes of hypertension and has been followed by Neurology and Cardiology.  Because of numbness and tingling in has hands and other symptoms, he is currently having the possibility of amyloidosis evaluated.  : : Constitutional:  Alert, well nourished, healthy appearance, no acute distress  Eyes:  No proptosis, no stare Thyroid: normal to palpation Pulmonary:  No respiratory distress, no accessory muscle use; normal rate and effort Cardiac:  Normal rate Vascular:  Endocrine:  No stigmata of Cushings Syndrome Musculoskeletal:  Normal gait, no involuntary movements Neurology:  No tremors Affect/Mood/Psych:  Oriented x 3; normal affect, normal insight/judgement, normal mood  .  Impression:  Patient kindly brought with him a careful outline of his recent history, symptoms and evaluation.  He has been evaluated by  Dermatology, GI, Neurology, Hematology, Cardiology and Endocrinology.   While he has a long history of apparent autonomic neuropathy, it is the symptoms of hypoglycemia that makes him eat, gain weight, and is of most concern to him.      Plan:  He has addressed the hypoglycemia with frequent small feedings and that has been associated with weight gain.   Another approach to hypoglycemia, especially if there is a reactive componenet is a very low carbohydrate diet and /or addition of medications such as Precose.  There appears to be a component of insulin resistance and medications to address this may also be useful. He has plans to follow up to Dermatology for a skin lesion on his back as well as GI.  He has an ongoing evaluation of possible amyloidosis in progress.       I have asked him to have overnight fasting insulin levels at [place] and a repeat 24 hour urine for catecholamines.    He will call me about two weeks after he completes these.

## 2024-08-02 NOTE — HISTORY OF PRESENT ILLNESS
[FreeTextEntry1] : Jul 30, 2024     in person accompanied by his wife   PCP:  Dr. Melissa Knapp              GI:  Dr. Darrel Fernandez           Neurology:  Dr. Russel Rodriguez            H/O:  Dr. Palma Culp            Card:  Dr. Pratibha Cardenas             Endo:  Dr. Stevenson            Derm:  Dr. James  - lesion on back  CC:   Abnormality of glucose tolerance with symptoms of hypoglycemia                                2/2024 A1c 6.3;  6/2024 A1c 6.2%                              insulin resistance (multiple skin tags - Dr. Ray)                4/21/23 phosphorus 2.1   70 yo visits because of symptoms of hypoglycemia.   He has had extensive, through evaluation for several issues and Dr. Stevenson has arranged for him to monitor his sugars with input from Sina (currently Libre3) which shows spikes in sugars after meals over 200 mg/dl on occasion and he develops symptoms of low sugar during the downward trajectory of sugar. He also underwent a formal 2 hour OGTT and developed symptoms at the 3rd hour.    He was told of autonomic neuropathy by Dr. Rodriguez and this limits his exercise. Recent A1c down to 6.1 Review of Sina 3 data shows essentially no hypoglycemia.  : : Constitutional:  Alert, well nourished, healthy appearance, no acute distress  Eyes:  No proptosis, no stare Thyroid: Pulmonary:  No respiratory distress, no accessory muscle use; normal rate and effort Cardiac:  Normal rate Vascular:  Endocrine:  No stigmata of Cushings Syndrome Musculoskeletal:  Normal gait, no involuntary movements Neurology:  No tremors Affect/Mood/Psych:  Oriented x 3; normal affect, normal insight/judgement, normal mood  . Impression:  Not a candidate for Precose b/o GERD per GI.b/o open LES.     Doing well on lower carb diet (although he had waffle, croissant this morning)      Plan:  Continue current strategy of low carbohydrate diet.         Jan 09, 2024     in person    PCP:  Dr. Adolfo Bailey           GI:  Dr. Darrel Fernandez           Neurology:  Dr. Russel Rodriguez            H/O:  Dr. Culp            Card:  Dr. Pratibha Cardenas     calcium score 849             Endo:  Dr. Stevenson            Derm:  Dr. James  - lesion on back  CC:   Abnormality of glucose tolerance with symptoms of hypoglycemia              4/21/23 phosphorus 2.1   69 yo visits because of symptoms of hypoglycemia.   He has had extensive, through evaluation for several issues and Dr. Stevenson has arranged for him to monitor his sugars with input from Sina (currently Libre3) which shows spikes in sugars after meals over 200 mg/dl on occasion and he develops symptoms of low sugar during the downward trajectory of sugar. He also underwent a formal 2 hour OGTT and developed symptoms at the 3rd hour.     : : Constitutional:  Alert, well nourished, healthy appearance, no acute distress  Eyes:  No proptosis, no stare Thyroid: Pulmonary:  No respiratory distress, no accessory muscle use; normal rate and effort Cardiac:  Normal rate Vascular:  Endocrine:  No stigmata of Cushings Syndrome Musculoskeletal:  Normal gait, no involuntary movements Neurology:  No tremors Affect/Mood/Psych:  Oriented x 3; normal affect, normal insight/judgement, normal mood  . Impression:  Available data strongly suggests that he is very sensitive to carbohydrates and the Sina data is "flat" when he avoids carbs.  Plan:  Very low carb diet and trial of Precose.      Dec 12, 2023    in person  PCP:  Dr. Adolfo Bailey           GI:  Dr. Darrel Fernandez           Neurology:  Dr. Russel Rodriguez            H/O:  Dr. Culp            Card:  Dr. Kg Manuel            Endo:  Dr. Stevenson            Derm:  Dr. James  - lesion on back  CC:   Abnormality of glucose tolerance with symptoms of hypoglycemia              4/21/23 phosphorus 2.1   69 yo visits because of symptoms of hypoglycemia.   He has had extensive, through evaluation for several issues and Dr. Stevenson has arranged for him to monitor his sugars with input from Sina (currently Libre3) which shows spikes in sugars after meals over 200 mg/dl on occasion and he develops symptoms of low sugar during the downward trajectory of sugar. He also underwent a formal 2 hour OGTT and developed symptoms at the 3rd hour.     He has a long history of autonomic neuropathy and gets light headed when he stands up.   He also has had episodes of hypertension and has been followed by Neurology and Cardiology.  Because of numbness and tingling in has hands and other symptoms, he is currently having the possibility of amyloidosis evaluated.  Imp:  Data on Sina 3 reviewed and confirms early diabetes. Plan:    Very low carb diet. It took a long time to convince him and his wife that croissants are not a part of a very low carb diet. His wife is very interested as to what to do if his sugars drop even on the very low carb diet.   Call me Sunday with data.    Oct 18, 2023   First visit - in person - accompanied by his wife (who reports she has DM)  PCP:  Dr. Adolfo Bailey           GI:  Dr. Darrel Fernandez           Neurology:  Dr. Russel Rodriguez            H/O:  Dr. Culp            Card:  Dr. Kg Manuel            Endo:  Dr. Stevenson            Derm:  Dr. James  - lesion on back  CC:   Abnormality of glucose tolerance with symptoms of hypoglycemia              4/21/23 phosphorus 2.1   69 yo visits because of symptoms of hypoglycemia.   He has had extensive, through evaluation for several issues and Dr. Stevenson has arranged for him to monitor his sugars with input from Sina (currently Libre3) which shows spikes in sugars after meals over 200 mg/dl on occasion and he develops symptoms of low sugar during the downward trajectory of sugar. He also underwent a formal 2 hour OGTT and developed symptoms at the 3rd hour.     He has a long history of autonomic neuropathy and gets light headed when he stands up.   He also has had episodes of hypertension and has been followed by Neurology and Cardiology.  Because of numbness and tingling in has hands and other symptoms, he is currently having the possibility of amyloidosis evaluated.  : : Constitutional:  Alert, well nourished, healthy appearance, no acute distress  Eyes:  No proptosis, no stare Thyroid: normal to palpation Pulmonary:  No respiratory distress, no accessory muscle use; normal rate and effort Cardiac:  Normal rate Vascular:  Endocrine:  No stigmata of Cushings Syndrome Musculoskeletal:  Normal gait, no involuntary movements Neurology:  No tremors Affect/Mood/Psych:  Oriented x 3; normal affect, normal insight/judgement, normal mood  .  Impression:  Patient kindly brought with him a careful outline of his recent history, symptoms and evaluation.  He has been evaluated by  Dermatology, GI, Neurology, Hematology, Cardiology and Endocrinology.   While he has a long history of apparent autonomic neuropathy, it is the symptoms of hypoglycemia that makes him eat, gain weight, and is of most concern to him.      Plan:  He has addressed the hypoglycemia with frequent small feedings and that has been associated with weight gain.   Another approach to hypoglycemia, especially if there is a reactive componenet is a very low carbohydrate diet and /or addition of medications such as Precose.  There appears to be a component of insulin resistance and medications to address this may also be useful. He has plans to follow up to Dermatology for a skin lesion on his back as well as GI.  He has an ongoing evaluation of possible amyloidosis in progress.       I have asked him to have overnight fasting insulin levels at [place] and a repeat 24 hour urine for catecholamines.    He will call me about two weeks after he completes these.

## 2024-08-16 ENCOUNTER — RESULT REVIEW (OUTPATIENT)
Age: 69
End: 2024-08-16

## 2024-08-19 ENCOUNTER — APPOINTMENT (OUTPATIENT)
Dept: INTERNAL MEDICINE | Facility: CLINIC | Age: 69
End: 2024-08-19
Payer: COMMERCIAL

## 2024-08-19 PROCEDURE — 96372 THER/PROPH/DIAG INJ SC/IM: CPT

## 2024-08-19 RX ORDER — CYANOCOBALAMIN 1000 UG/ML
1000 INJECTION INTRAMUSCULAR; SUBCUTANEOUS
Qty: 0 | Refills: 0 | Status: COMPLETED | OUTPATIENT
Start: 2024-08-19

## 2024-09-16 ENCOUNTER — APPOINTMENT (OUTPATIENT)
Dept: CARDIOLOGY | Facility: CLINIC | Age: 69
End: 2024-09-16
Payer: COMMERCIAL

## 2024-09-16 ENCOUNTER — NON-APPOINTMENT (OUTPATIENT)
Age: 69
End: 2024-09-16

## 2024-09-16 DIAGNOSIS — E50.9 VITAMIN A DEFICIENCY, UNSPECIFIED: ICD-10-CM

## 2024-09-16 DIAGNOSIS — E51.9 THIAMINE DEFICIENCY, UNSPECIFIED: ICD-10-CM

## 2024-09-16 PROCEDURE — 36415 COLL VENOUS BLD VENIPUNCTURE: CPT

## 2024-09-19 ENCOUNTER — RESULT REVIEW (OUTPATIENT)
Age: 69
End: 2024-09-19

## 2024-09-23 ENCOUNTER — APPOINTMENT (OUTPATIENT)
Dept: INTERNAL MEDICINE | Facility: CLINIC | Age: 69
End: 2024-09-23
Payer: COMMERCIAL

## 2024-09-23 PROCEDURE — 96372 THER/PROPH/DIAG INJ SC/IM: CPT

## 2024-09-23 RX ORDER — CYANOCOBALAMIN 1000 UG/ML
1000 INJECTION INTRAMUSCULAR; SUBCUTANEOUS
Qty: 0 | Refills: 0 | Status: COMPLETED | OUTPATIENT
Start: 2024-09-23

## 2024-10-15 ENCOUNTER — APPOINTMENT (OUTPATIENT)
Dept: ENDOCRINOLOGY | Facility: CLINIC | Age: 69
End: 2024-10-15
Payer: COMMERCIAL

## 2024-10-15 VITALS
SYSTOLIC BLOOD PRESSURE: 116 MMHG | OXYGEN SATURATION: 98 % | HEART RATE: 73 BPM | WEIGHT: 182 LBS | DIASTOLIC BLOOD PRESSURE: 78 MMHG | BODY MASS INDEX: 28.56 KG/M2 | HEIGHT: 67 IN

## 2024-10-15 DIAGNOSIS — E11.9 TYPE 2 DIABETES MELLITUS W/OUT COMPLICATIONS: ICD-10-CM

## 2024-10-15 DIAGNOSIS — E16.2 HYPOGLYCEMIA, UNSPECIFIED: ICD-10-CM

## 2024-10-15 PROCEDURE — 99214 OFFICE O/P EST MOD 30 MIN: CPT

## 2024-10-18 LAB
ANION GAP SERPL CALC-SCNC: 13 MMOL/L
BUN SERPL-MCNC: 10 MG/DL
CALCIUM SERPL-MCNC: 9.6 MG/DL
CHLORIDE SERPL-SCNC: 103 MMOL/L
CO2 SERPL-SCNC: 25 MMOL/L
CREAT SERPL-MCNC: 1.04 MG/DL
EGFR: 77 ML/MIN/1.73M2
ESTIMATED AVERAGE GLUCOSE: 131 MG/DL
GLUCOSE SERPL-MCNC: 124 MG/DL
HBA1C MFR BLD HPLC: 6.2 %
POTASSIUM SERPL-SCNC: 4.7 MMOL/L
SODIUM SERPL-SCNC: 141 MMOL/L

## 2024-10-21 ENCOUNTER — APPOINTMENT (OUTPATIENT)
Dept: INTERNAL MEDICINE | Facility: CLINIC | Age: 69
End: 2024-10-21
Payer: COMMERCIAL

## 2024-10-21 PROCEDURE — 96372 THER/PROPH/DIAG INJ SC/IM: CPT

## 2024-10-21 RX ORDER — CYANOCOBALAMIN 1000 UG/ML
1000 INJECTION INTRAMUSCULAR; SUBCUTANEOUS
Qty: 0 | Refills: 0 | Status: COMPLETED | OUTPATIENT
Start: 2024-10-21

## 2024-11-18 ENCOUNTER — APPOINTMENT (OUTPATIENT)
Dept: INTERNAL MEDICINE | Facility: CLINIC | Age: 69
End: 2024-11-18
Payer: COMMERCIAL

## 2024-11-18 PROCEDURE — 96372 THER/PROPH/DIAG INJ SC/IM: CPT

## 2024-11-18 RX ORDER — CYANOCOBALAMIN 1000 UG/ML
1000 INJECTION, SOLUTION INTRAMUSCULAR; SUBCUTANEOUS
Qty: 0 | Refills: 0 | Status: COMPLETED | OUTPATIENT
Start: 2024-11-18

## 2024-12-17 ENCOUNTER — APPOINTMENT (OUTPATIENT)
Dept: FAMILY MEDICINE | Facility: CLINIC | Age: 69
End: 2024-12-17

## 2024-12-26 ENCOUNTER — LABORATORY RESULT (OUTPATIENT)
Age: 69
End: 2024-12-26

## 2024-12-26 ENCOUNTER — APPOINTMENT (OUTPATIENT)
Dept: FAMILY MEDICINE | Facility: CLINIC | Age: 69
End: 2024-12-26
Payer: COMMERCIAL

## 2024-12-26 VITALS
DIASTOLIC BLOOD PRESSURE: 80 MMHG | HEART RATE: 72 BPM | WEIGHT: 176 LBS | HEIGHT: 67 IN | SYSTOLIC BLOOD PRESSURE: 128 MMHG | OXYGEN SATURATION: 99 % | BODY MASS INDEX: 27.62 KG/M2

## 2024-12-26 DIAGNOSIS — G90.9 DISORDER OF THE AUTONOMIC NERVOUS SYSTEM, UNSPECIFIED: ICD-10-CM

## 2024-12-26 DIAGNOSIS — E51.9 THIAMINE DEFICIENCY, UNSPECIFIED: ICD-10-CM

## 2024-12-26 DIAGNOSIS — E55.9 VITAMIN D DEFICIENCY, UNSPECIFIED: ICD-10-CM

## 2024-12-26 DIAGNOSIS — E78.5 HYPERLIPIDEMIA, UNSPECIFIED: ICD-10-CM

## 2024-12-26 DIAGNOSIS — E53.8 DEFICIENCY OF OTHER SPECIFIED B GROUP VITAMINS: ICD-10-CM

## 2024-12-26 DIAGNOSIS — R53.83 OTHER FATIGUE: ICD-10-CM

## 2024-12-26 DIAGNOSIS — E11.9 TYPE 2 DIABETES MELLITUS W/OUT COMPLICATIONS: ICD-10-CM

## 2024-12-26 DIAGNOSIS — R93.1 ABNORMAL FINDINGS ON DIAGNOSTIC IMAGING OF HEART AND CORONARY CIRCULATION: ICD-10-CM

## 2024-12-26 DIAGNOSIS — J30.2 OTHER SEASONAL ALLERGIC RHINITIS: ICD-10-CM

## 2024-12-26 PROCEDURE — 99214 OFFICE O/P EST MOD 30 MIN: CPT

## 2024-12-26 PROCEDURE — G2211 COMPLEX E/M VISIT ADD ON: CPT | Mod: NC

## 2024-12-26 PROCEDURE — 36415 COLL VENOUS BLD VENIPUNCTURE: CPT

## 2024-12-30 LAB
A ALTERNATA IGE QN: 0.45 KUA/L
A FUMIGATUS IGE QN: 0.65 KUA/L
ALBUMIN SERPL ELPH-MCNC: 4.1 G/DL
ALMOND IGE QN: <0.1 KUA/L
ALP BLD-CCNC: 135 U/L
ALT SERPL-CCNC: 19 U/L
ANION GAP SERPL CALC-SCNC: 10 MMOL/L
AST SERPL-CCNC: 17 U/L
BASOPHILS # BLD AUTO: 0.04 K/UL
BASOPHILS NFR BLD AUTO: 0.6 %
BERMUDA GRASS IGE QN: 0.19 KUA/L
BILIRUB SERPL-MCNC: 0.4 MG/DL
BOXELDER IGE QN: 0.26 KUA/L
BRAZIL NUT IGE QN: <0.1 KUA/L
BUN SERPL-MCNC: 11 MG/DL
C HERBARUM IGE QN: 0.71 KUA/L
CALCIUM SERPL-MCNC: 9.5 MG/DL
CALIF WALNUT IGE QN: <0.1 KUA/L
CASHEW NUT IGE QN: <0.1 KUA/L
CAT DANDER IGE QN: 0.24 KUA/L
CHLORIDE SERPL-SCNC: 103 MMOL/L
CHOLEST SERPL-MCNC: 204 MG/DL
CMN PIGWEED IGE QN: <0.1 KUA/L
CO2 SERPL-SCNC: 27 MMOL/L
CODFISH IGE QN: <0.1 KUA/L
COMMON RAGWEED IGE QN: 0.16 KUA/L
COTTONWOOD IGE QN: <0.1 KUA/L
COW MILK IGE QN: <0.1 KUA/L
CREAT SERPL-MCNC: 0.98 MG/DL
D FARINAE IGE QN: 0.53 KUA/L
D PTERONYSS IGE QN: 0.68 KUA/L
DEPRECATED A ALTERNATA IGE RAST QL: 1
DEPRECATED A FUMIGATUS IGE RAST QL: 1
DEPRECATED ALMOND IGE RAST QL: 0
DEPRECATED BERMUDA GRASS IGE RAST QL: NORMAL
DEPRECATED BOXELDER IGE RAST QL: NORMAL
DEPRECATED BRAZIL NUT IGE RAST QL: 0
DEPRECATED C HERBARUM IGE RAST QL: 2
DEPRECATED CASHEW NUT IGE RAST QL: 0
DEPRECATED CAT DANDER IGE RAST QL: NORMAL
DEPRECATED CODFISH IGE RAST QL: 0
DEPRECATED COMMON PIGWEED IGE RAST QL: 0
DEPRECATED COMMON RAGWEED IGE RAST QL: NORMAL
DEPRECATED COTTONWOOD IGE RAST QL: 0
DEPRECATED COW MILK IGE RAST QL: 0
DEPRECATED D FARINAE IGE RAST QL: 1
DEPRECATED D PTERONYSS IGE RAST QL: 1
DEPRECATED DOG DANDER IGE RAST QL: NORMAL
DEPRECATED EGG WHITE IGE RAST QL: 0
DEPRECATED GOOSEFOOT IGE RAST QL: NORMAL
DEPRECATED HAZELNUT IGE RAST QL: 0
DEPRECATED LONDON PLANE IGE RAST QL: NORMAL
DEPRECATED MOUSE URINE PROT IGE RAST QL: 0
DEPRECATED MUGWORT IGE RAST QL: 2
DEPRECATED P NOTATUM IGE RAST QL: 2
DEPRECATED PEANUT IGE RAST QL: NORMAL
DEPRECATED RED CEDAR IGE RAST QL: NORMAL
DEPRECATED ROACH IGE RAST QL: NORMAL
DEPRECATED SALMON IGE RAST QL: 0
DEPRECATED SCALLOP IGE RAST QL: 0.11 KUA/L
DEPRECATED SESAME SEED IGE RAST QL: 1
DEPRECATED SHEEP SORREL IGE RAST QL: 0
DEPRECATED SHRIMP IGE RAST QL: NORMAL
DEPRECATED SILVER BIRCH IGE RAST QL: 0
DEPRECATED SOYBEAN IGE RAST QL: 0
DEPRECATED TIMOTHY IGE RAST QL: 0
DEPRECATED TUNA IGE RAST QL: 0
DEPRECATED WALNUT IGE RAST QL: 0
DEPRECATED WHEAT IGE RAST QL: 0
DEPRECATED WHITE ASH IGE RAST QL: 1
DEPRECATED WHITE OAK IGE RAST QL: 0
DOG DANDER IGE QN: 0.13 KUA/L
EGFR: 83 ML/MIN/1.73M2
EGG WHITE IGE QN: <0.1 KUA/L
EOSINOPHIL # BLD AUTO: 0.27 K/UL
EOSINOPHIL NFR BLD AUTO: 4.1 %
ESTIMATED AVERAGE GLUCOSE: 131 MG/DL
FOLATE SERPL-MCNC: 13.5 NG/ML
GLUCOSE SERPL-MCNC: 129 MG/DL
GOOSEFOOT IGE QN: 0.14 KUA/L
HAZELNUT IGE QN: <0.1 KUA/L
HBA1C MFR BLD HPLC: 6.2 %
HCT VFR BLD CALC: 47.2 %
HCYS SERPL-MCNC: 12.1 UMOL/L
HDLC SERPL-MCNC: 49 MG/DL
HGB BLD-MCNC: 15.8 G/DL
IMM GRANULOCYTES NFR BLD AUTO: 0.3 %
LDLC SERPL CALC-MCNC: 129 MG/DL
LONDON PLANE IGE QN: 0.12 KUA/L
LYMPHOCYTES # BLD AUTO: 1.72 K/UL
LYMPHOCYTES NFR BLD AUTO: 26.1 %
MAGNESIUM SERPL-MCNC: 2.1 MG/DL
MAN DIFF?: NORMAL
MCHC RBC-ENTMCNC: 31.4 PG
MCHC RBC-ENTMCNC: 33.5 G/DL
MCV RBC AUTO: 93.8 FL
METHYLMALONATE SERPL-SCNC: 122 NMOL/L
MONOCYTES # BLD AUTO: 0.65 K/UL
MONOCYTES NFR BLD AUTO: 9.9 %
MOUSE URINE PROT IGE QN: <0.1 KUA/L
MUGWORT IGE QN: 1.26 KUA/L
MULBERRY (T70) CLASS: 0
MULBERRY (T70) CONC: <0.1 KUA/L
NEUTROPHILS # BLD AUTO: 3.89 K/UL
NEUTROPHILS NFR BLD AUTO: 59 %
NONHDLC SERPL-MCNC: 155 MG/DL
P NOTATUM IGE QN: 0.93 KUA/L
PEANUT IGE QN: 0.11 KUA/L
PLATELET # BLD AUTO: 256 K/UL
POTASSIUM SERPL-SCNC: 4.7 MMOL/L
PROT SERPL-MCNC: 6.8 G/DL
PSA FREE FLD-MCNC: 22 %
PSA FREE SERPL-MCNC: 0.92 NG/ML
PSA SERPL-MCNC: 4.18 NG/ML
RBC # BLD: 5.03 M/UL
RBC # FLD: 13.2 %
RED CEDAR IGE QN: 0.16 KUA/L
ROACH IGE QN: 0.22 KUA/L
SALMON IGE QN: <0.1 KUA/L
SCALLOP IGE QN: 0.3 KUA/L
SCALLOP IGE QN: NORMAL
SESAME SEED IGE QN: 0.62 KUA/L
SHEEP SORREL IGE QN: <0.1 KUA/L
SILVER BIRCH IGE QN: <0.1 KUA/L
SODIUM SERPL-SCNC: 140 MMOL/L
SOYBEAN IGE QN: <0.1 KUA/L
TIMOTHY IGE QN: <0.1 KUA/L
TOTAL IGE SMQN RAST: 1037 KU/L
TOTAL IGE SMQN RAST: 1037 KU/L
TREE ALLERG MIX1 IGE QL: 0
TRIGL SERPL-MCNC: 147 MG/DL
TUNA IGE QN: <0.1 KUA/L
VIT B12 SERPL-MCNC: 405 PG/ML
WALNUT IGE QN: <0.1 KUA/L
WBC # FLD AUTO: 6.59 K/UL
WHEAT IGE QN: <0.1 KUA/L
WHITE ASH IGE QN: 0.4 KUA/L
WHITE ELM IGE QN: 0.14 KUA/L
WHITE ELM IGE QN: NORMAL
WHITE OAK IGE QN: <0.1 KUA/L

## 2025-01-02 LAB
ALUMINUM SERPL-MCNC: 19 UG/L
VIT B1 SERPL-MCNC: 85.7 NMOL/L
VIT B6 SERPL-MCNC: 5.6 UG/L

## 2025-01-08 ENCOUNTER — APPOINTMENT (OUTPATIENT)
Dept: INTERNAL MEDICINE | Facility: CLINIC | Age: 70
End: 2025-01-08
Payer: OTHER GOVERNMENT

## 2025-01-08 PROCEDURE — 96372 THER/PROPH/DIAG INJ SC/IM: CPT

## 2025-01-08 RX ORDER — CYANOCOBALAMIN 1000 UG/ML
1000 INJECTION, SOLUTION INTRAMUSCULAR; SUBCUTANEOUS
Qty: 0 | Refills: 0 | Status: COMPLETED | OUTPATIENT
Start: 2025-01-07

## 2025-01-10 DIAGNOSIS — R82.90 UNSPECIFIED ABNORMAL FINDINGS IN URINE: ICD-10-CM

## 2025-01-10 DIAGNOSIS — Z13.820 ENCOUNTER FOR SCREENING FOR OSTEOPOROSIS: ICD-10-CM

## 2025-01-13 ENCOUNTER — RX RENEWAL (OUTPATIENT)
Age: 70
End: 2025-01-13

## 2025-01-13 LAB
APPEARANCE: CLEAR
BILIRUBIN URINE: NEGATIVE
BLOOD URINE: NEGATIVE
COLOR: YELLOW
GLUCOSE QUALITATIVE U: NEGATIVE MG/DL
KETONES URINE: NEGATIVE MG/DL
LEUKOCYTE ESTERASE URINE: NEGATIVE
NITRITE URINE: NEGATIVE
PH URINE: 6.5
PROTEIN URINE: NEGATIVE MG/DL
SPECIFIC GRAVITY URINE: 1.02
UROBILINOGEN URINE: 1 MG/DL

## 2025-02-03 ENCOUNTER — NON-APPOINTMENT (OUTPATIENT)
Age: 70
End: 2025-02-03

## 2025-02-03 ENCOUNTER — APPOINTMENT (OUTPATIENT)
Dept: FAMILY MEDICINE | Facility: CLINIC | Age: 70
End: 2025-02-03
Payer: OTHER GOVERNMENT

## 2025-02-03 VITALS
OXYGEN SATURATION: 96 % | TEMPERATURE: 100.2 F | DIASTOLIC BLOOD PRESSURE: 60 MMHG | BODY MASS INDEX: 28.25 KG/M2 | HEART RATE: 74 BPM | SYSTOLIC BLOOD PRESSURE: 110 MMHG | HEIGHT: 67 IN | WEIGHT: 180 LBS

## 2025-02-03 DIAGNOSIS — R19.5 OTHER FECAL ABNORMALITIES: ICD-10-CM

## 2025-02-03 PROCEDURE — 99214 OFFICE O/P EST MOD 30 MIN: CPT

## 2025-02-03 PROCEDURE — G2211 COMPLEX E/M VISIT ADD ON: CPT

## 2025-02-05 ENCOUNTER — APPOINTMENT (OUTPATIENT)
Dept: INTERNAL MEDICINE | Facility: CLINIC | Age: 70
End: 2025-02-05
Payer: OTHER GOVERNMENT

## 2025-02-05 PROCEDURE — 96372 THER/PROPH/DIAG INJ SC/IM: CPT

## 2025-02-05 RX ORDER — CYANOCOBALAMIN 1000 UG/ML
1000 INJECTION, SOLUTION INTRAMUSCULAR; SUBCUTANEOUS
Qty: 0 | Refills: 0 | Status: COMPLETED | OUTPATIENT
Start: 2025-02-03

## 2025-02-13 ENCOUNTER — APPOINTMENT (OUTPATIENT)
Dept: GASTROENTEROLOGY | Facility: CLINIC | Age: 70
End: 2025-02-13
Payer: OTHER GOVERNMENT

## 2025-02-13 VITALS
BODY MASS INDEX: 27.78 KG/M2 | WEIGHT: 177 LBS | DIASTOLIC BLOOD PRESSURE: 74 MMHG | HEIGHT: 67 IN | SYSTOLIC BLOOD PRESSURE: 128 MMHG

## 2025-02-13 DIAGNOSIS — R14.0 ABDOMINAL DISTENSION (GASEOUS): ICD-10-CM

## 2025-02-13 DIAGNOSIS — R19.7 DIARRHEA, UNSPECIFIED: ICD-10-CM

## 2025-02-13 PROBLEM — K21.9 GERD (GASTROESOPHAGEAL REFLUX DISEASE): Status: ACTIVE | Noted: 2025-02-13

## 2025-02-13 PROCEDURE — 99214 OFFICE O/P EST MOD 30 MIN: CPT

## 2025-02-13 RX ORDER — RIFAXIMIN 550 MG/1
550 TABLET ORAL
Qty: 42 | Refills: 1 | Status: ACTIVE | COMMUNITY
Start: 2025-02-13 | End: 1900-01-01

## 2025-02-13 RX ORDER — VONOPRAZAN FUMARATE 26.72 MG/1
20 TABLET ORAL
Qty: 30 | Refills: 5 | Status: ACTIVE | COMMUNITY
Start: 2025-02-13 | End: 1900-01-01

## 2025-02-19 DIAGNOSIS — K21.9 GASTRO-ESOPHAGEAL REFLUX DISEASE W/OUT ESOPHAGITIS: ICD-10-CM

## 2025-02-19 RX ORDER — ESOMEPRAZOLE MAGNESIUM 40 MG/1
40 CAPSULE, DELAYED RELEASE ORAL DAILY
Qty: 30 | Refills: 3 | Status: ACTIVE | COMMUNITY
Start: 2025-02-19 | End: 1900-01-01

## 2025-02-21 LAB — H PYLORI AG STL QL: NEGATIVE

## 2025-02-24 ENCOUNTER — APPOINTMENT (OUTPATIENT)
Dept: ENDOCRINOLOGY | Facility: CLINIC | Age: 70
End: 2025-02-24
Payer: OTHER GOVERNMENT

## 2025-02-24 VITALS
OXYGEN SATURATION: 98 % | HEIGHT: 67 IN | BODY MASS INDEX: 27.62 KG/M2 | WEIGHT: 176 LBS | DIASTOLIC BLOOD PRESSURE: 60 MMHG | HEART RATE: 74 BPM | SYSTOLIC BLOOD PRESSURE: 120 MMHG

## 2025-02-24 DIAGNOSIS — E11.9 TYPE 2 DIABETES MELLITUS W/OUT COMPLICATIONS: ICD-10-CM

## 2025-02-24 PROCEDURE — 99215 OFFICE O/P EST HI 40 MIN: CPT

## 2025-03-05 ENCOUNTER — APPOINTMENT (OUTPATIENT)
Dept: INTERNAL MEDICINE | Facility: CLINIC | Age: 70
End: 2025-03-05
Payer: OTHER GOVERNMENT

## 2025-03-05 PROCEDURE — 96372 THER/PROPH/DIAG INJ SC/IM: CPT

## 2025-03-05 RX ORDER — CYANOCOBALAMIN 1000 UG/ML
1000 INJECTION, SOLUTION INTRAMUSCULAR; SUBCUTANEOUS
Qty: 0 | Refills: 0 | Status: COMPLETED | OUTPATIENT
Start: 2025-02-28

## 2025-03-31 ENCOUNTER — RESULT REVIEW (OUTPATIENT)
Age: 70
End: 2025-03-31

## 2025-04-09 ENCOUNTER — APPOINTMENT (OUTPATIENT)
Dept: INTERNAL MEDICINE | Facility: CLINIC | Age: 70
End: 2025-04-09
Payer: OTHER GOVERNMENT

## 2025-04-09 PROCEDURE — 96372 THER/PROPH/DIAG INJ SC/IM: CPT

## 2025-04-09 RX ORDER — CYANOCOBALAMIN 1000 UG/ML
1000 INJECTION, SOLUTION INTRAMUSCULAR; SUBCUTANEOUS
Qty: 0 | Refills: 0 | Status: COMPLETED | OUTPATIENT
Start: 2025-04-04

## 2025-05-01 ENCOUNTER — RX RENEWAL (OUTPATIENT)
Age: 70
End: 2025-05-01

## 2025-05-05 ENCOUNTER — APPOINTMENT (OUTPATIENT)
Dept: INTERNAL MEDICINE | Facility: CLINIC | Age: 70
End: 2025-05-05
Payer: OTHER GOVERNMENT

## 2025-05-05 DIAGNOSIS — R53.83 OTHER FATIGUE: ICD-10-CM

## 2025-05-05 PROCEDURE — 36415 COLL VENOUS BLD VENIPUNCTURE: CPT

## 2025-05-08 LAB — ALUMINUM SERPL-MCNC: 7 UG/L

## 2025-05-14 ENCOUNTER — MED ADMIN CHARGE (OUTPATIENT)
Age: 70
End: 2025-05-14

## 2025-05-14 ENCOUNTER — APPOINTMENT (OUTPATIENT)
Dept: INTERNAL MEDICINE | Facility: CLINIC | Age: 70
End: 2025-05-14
Payer: OTHER GOVERNMENT

## 2025-05-14 DIAGNOSIS — E53.8 DEFICIENCY OF OTHER SPECIFIED B GROUP VITAMINS: ICD-10-CM

## 2025-05-14 PROCEDURE — 96372 THER/PROPH/DIAG INJ SC/IM: CPT

## 2025-05-14 RX ORDER — CYANOCOBALAMIN 1000 UG/ML
1000 INJECTION, SOLUTION INTRAMUSCULAR; SUBCUTANEOUS
Qty: 0 | Refills: 0 | Status: COMPLETED | OUTPATIENT
Start: 2025-05-12

## 2025-05-15 DIAGNOSIS — G62.9 POLYNEUROPATHY, UNSPECIFIED: ICD-10-CM

## 2025-05-16 RX ORDER — ESOMEPRAZOLE MAGNESIUM 40 MG/1
40 CAPSULE, DELAYED RELEASE ORAL
Qty: 90 | Refills: 3 | Status: ACTIVE | COMMUNITY
Start: 2025-05-16 | End: 1900-01-01

## 2025-05-22 ENCOUNTER — RX RENEWAL (OUTPATIENT)
Age: 70
End: 2025-05-22

## 2025-05-30 LAB
ANION GAP SERPL CALC-SCNC: 11 MMOL/L
BUN SERPL-MCNC: 10 MG/DL
CALCIUM SERPL-MCNC: 9.5 MG/DL
CHLORIDE SERPL-SCNC: 103 MMOL/L
CO2 SERPL-SCNC: 26 MMOL/L
CREAT SERPL-MCNC: 1.16 MG/DL
EGFRCR SERPLBLD CKD-EPI 2021: 67 ML/MIN/1.73M2
GLUCOSE SERPL-MCNC: 79 MG/DL
POTASSIUM SERPL-SCNC: 4.3 MMOL/L
SODIUM SERPL-SCNC: 140 MMOL/L

## 2025-06-11 ENCOUNTER — APPOINTMENT (OUTPATIENT)
Dept: INTERNAL MEDICINE | Facility: CLINIC | Age: 70
End: 2025-06-11
Payer: OTHER GOVERNMENT

## 2025-06-11 PROCEDURE — 96372 THER/PROPH/DIAG INJ SC/IM: CPT

## 2025-06-11 RX ORDER — CYANOCOBALAMIN 1000 UG/ML
1000 INJECTION, SOLUTION INTRAMUSCULAR; SUBCUTANEOUS
Qty: 0 | Refills: 0 | Status: COMPLETED | OUTPATIENT
Start: 2025-06-11

## 2025-06-12 RX ORDER — BLOOD SUGAR DIAGNOSTIC
STRIP MISCELLANEOUS
Qty: 360 | Refills: 2 | Status: ACTIVE | COMMUNITY
Start: 2025-06-12 | End: 1900-01-01

## 2025-06-20 ENCOUNTER — RESULT REVIEW (OUTPATIENT)
Age: 70
End: 2025-06-20

## 2025-06-26 ENCOUNTER — APPOINTMENT (OUTPATIENT)
Dept: FAMILY MEDICINE | Facility: CLINIC | Age: 70
End: 2025-06-26
Payer: OTHER GOVERNMENT

## 2025-06-26 VITALS
WEIGHT: 178 LBS | DIASTOLIC BLOOD PRESSURE: 66 MMHG | SYSTOLIC BLOOD PRESSURE: 110 MMHG | OXYGEN SATURATION: 98 % | HEIGHT: 67 IN | TEMPERATURE: 100.2 F | HEART RATE: 67 BPM | BODY MASS INDEX: 27.94 KG/M2

## 2025-06-26 PROCEDURE — G2211 COMPLEX E/M VISIT ADD ON: CPT

## 2025-06-26 PROCEDURE — 99214 OFFICE O/P EST MOD 30 MIN: CPT

## 2025-07-09 ENCOUNTER — APPOINTMENT (OUTPATIENT)
Dept: INTERNAL MEDICINE | Facility: CLINIC | Age: 70
End: 2025-07-09
Payer: OTHER GOVERNMENT

## 2025-07-09 PROCEDURE — 96372 THER/PROPH/DIAG INJ SC/IM: CPT

## 2025-07-09 RX ORDER — CYANOCOBALAMIN 1000 UG/ML
1000 INJECTION, SOLUTION INTRAMUSCULAR; SUBCUTANEOUS
Qty: 0 | Refills: 0 | Status: COMPLETED | OUTPATIENT
Start: 2025-07-07

## 2025-07-24 ENCOUNTER — NON-APPOINTMENT (OUTPATIENT)
Age: 70
End: 2025-07-24

## 2025-07-24 DIAGNOSIS — Z13.820 ENCOUNTER FOR SCREENING FOR OSTEOPOROSIS: ICD-10-CM

## 2025-07-24 DIAGNOSIS — R19.5 OTHER FECAL ABNORMALITIES: ICD-10-CM

## 2025-07-24 DIAGNOSIS — G62.9 POLYNEUROPATHY, UNSPECIFIED: ICD-10-CM

## 2025-07-24 DIAGNOSIS — J30.2 OTHER SEASONAL ALLERGIC RHINITIS: ICD-10-CM

## 2025-07-24 DIAGNOSIS — E51.9 THIAMINE DEFICIENCY, UNSPECIFIED: ICD-10-CM

## 2025-07-24 DIAGNOSIS — E04.9 NONTOXIC GOITER, UNSPECIFIED: ICD-10-CM

## 2025-07-24 DIAGNOSIS — E50.9 VITAMIN A DEFICIENCY, UNSPECIFIED: ICD-10-CM

## 2025-07-24 DIAGNOSIS — R14.0 ABDOMINAL DISTENSION (GASEOUS): ICD-10-CM

## 2025-07-24 DIAGNOSIS — M54.50 LOW BACK PAIN, UNSPECIFIED: ICD-10-CM

## 2025-07-24 DIAGNOSIS — R82.90 UNSPECIFIED ABNORMAL FINDINGS IN URINE: ICD-10-CM

## 2025-07-24 DIAGNOSIS — R19.7 DIARRHEA, UNSPECIFIED: ICD-10-CM

## 2025-07-24 DIAGNOSIS — S83.241A OTHER TEAR OF MEDIAL MENISCUS, CURRENT INJURY, RIGHT KNEE, INITIAL ENCOUNTER: ICD-10-CM

## 2025-07-24 DIAGNOSIS — K21.9 GASTRO-ESOPHAGEAL REFLUX DISEASE W/OUT ESOPHAGITIS: ICD-10-CM

## 2025-07-24 DIAGNOSIS — D86.3 SARCOIDOSIS OF SKIN: ICD-10-CM

## 2025-07-25 ENCOUNTER — APPOINTMENT (OUTPATIENT)
Dept: CARDIOLOGY | Facility: CLINIC | Age: 70
End: 2025-07-25
Payer: OTHER GOVERNMENT

## 2025-07-25 VITALS
WEIGHT: 178 LBS | OXYGEN SATURATION: 97 % | HEART RATE: 71 BPM | HEIGHT: 67 IN | DIASTOLIC BLOOD PRESSURE: 75 MMHG | SYSTOLIC BLOOD PRESSURE: 131 MMHG | BODY MASS INDEX: 27.94 KG/M2

## 2025-07-25 DIAGNOSIS — I10 ESSENTIAL (PRIMARY) HYPERTENSION: ICD-10-CM

## 2025-07-25 DIAGNOSIS — R93.1 ABNORMAL FINDINGS ON DIAGNOSTIC IMAGING OF HEART AND CORONARY CIRCULATION: ICD-10-CM

## 2025-07-25 DIAGNOSIS — G90.9 DISORDER OF THE AUTONOMIC NERVOUS SYSTEM, UNSPECIFIED: ICD-10-CM

## 2025-07-25 DIAGNOSIS — I49.1 ATRIAL PREMATURE DEPOLARIZATION: ICD-10-CM

## 2025-07-25 DIAGNOSIS — Z87.898 PERSONAL HISTORY OF OTHER SPECIFIED CONDITIONS: ICD-10-CM

## 2025-07-25 DIAGNOSIS — E78.5 HYPERLIPIDEMIA, UNSPECIFIED: ICD-10-CM

## 2025-07-25 DIAGNOSIS — E11.9 TYPE 2 DIABETES MELLITUS W/OUT COMPLICATIONS: ICD-10-CM

## 2025-07-25 PROCEDURE — 99215 OFFICE O/P EST HI 40 MIN: CPT

## 2025-07-25 PROCEDURE — 93000 ELECTROCARDIOGRAM COMPLETE: CPT

## 2025-07-25 RX ORDER — CHOLECALCIFEROL (VITAMIN D3) 125 MCG
TABLET ORAL
Refills: 0 | Status: ACTIVE | COMMUNITY

## 2025-07-25 RX ORDER — IBUPROFEN 800 MG/1
800 TABLET, FILM COATED ORAL 3 TIMES DAILY
Refills: 0 | Status: ACTIVE | COMMUNITY

## 2025-07-25 RX ORDER — ASPIRIN 81 MG/1
81 TABLET, DELAYED RELEASE ORAL DAILY
Qty: 90 | Refills: 0 | Status: ACTIVE | COMMUNITY
Start: 2025-07-25 | End: 1900-01-01

## 2025-07-25 RX ORDER — CYCLOBENZAPRINE HYDROCHLORIDE 5 MG/1
5 TABLET, FILM COATED ORAL 3 TIMES DAILY
Refills: 0 | Status: ACTIVE | COMMUNITY

## 2025-07-25 RX ORDER — EZETIMIBE 10 MG/1
10 TABLET ORAL
Qty: 90 | Refills: 3 | Status: ACTIVE | COMMUNITY
Start: 2025-07-25 | End: 1900-01-01

## 2025-08-13 ENCOUNTER — APPOINTMENT (OUTPATIENT)
Dept: INTERNAL MEDICINE | Facility: CLINIC | Age: 70
End: 2025-08-13
Payer: OTHER GOVERNMENT

## 2025-08-13 PROCEDURE — 96372 THER/PROPH/DIAG INJ SC/IM: CPT

## 2025-08-13 RX ORDER — CYANOCOBALAMIN 1000 UG/ML
1000 INJECTION, SOLUTION INTRAMUSCULAR; SUBCUTANEOUS
Qty: 0 | Refills: 0 | Status: COMPLETED | OUTPATIENT
Start: 2025-08-12

## 2025-08-19 ENCOUNTER — APPOINTMENT (OUTPATIENT)
Dept: CARDIOLOGY | Facility: CLINIC | Age: 70
End: 2025-08-19
Payer: OTHER GOVERNMENT

## 2025-08-19 PROCEDURE — 36415 COLL VENOUS BLD VENIPUNCTURE: CPT

## 2025-08-20 ENCOUNTER — TRANSCRIPTION ENCOUNTER (OUTPATIENT)
Age: 70
End: 2025-08-20

## 2025-09-10 ENCOUNTER — APPOINTMENT (OUTPATIENT)
Dept: ENDOCRINOLOGY | Facility: CLINIC | Age: 70
End: 2025-09-10
Payer: OTHER GOVERNMENT

## 2025-09-10 DIAGNOSIS — E11.9 TYPE 2 DIABETES MELLITUS W/OUT COMPLICATIONS: ICD-10-CM

## 2025-09-10 DIAGNOSIS — E16.2 HYPOGLYCEMIA, UNSPECIFIED: ICD-10-CM

## 2025-09-10 PROCEDURE — 36415 COLL VENOUS BLD VENIPUNCTURE: CPT

## 2025-09-10 PROCEDURE — 99215 OFFICE O/P EST HI 40 MIN: CPT

## 2025-09-11 PROBLEM — E16.2 HYPOGLYCEMIA: Status: ACTIVE | Noted: 2025-09-10

## 2025-09-11 LAB
25(OH)D3 SERPL-MCNC: 55.8 NG/ML
ANION GAP SERPL CALC-SCNC: 14 MMOL/L
BUN SERPL-MCNC: 12 MG/DL
CALCIUM SERPL-MCNC: 9.3 MG/DL
CHLORIDE SERPL-SCNC: 103 MMOL/L
CO2 SERPL-SCNC: 24 MMOL/L
CREAT SERPL-MCNC: 1.15 MG/DL
EGFRCR SERPLBLD CKD-EPI 2021: 68 ML/MIN/1.73M2
ESTIMATED AVERAGE GLUCOSE: 123 MG/DL
GGT SERPL-CCNC: 201 U/L
GLUCOSE SERPL-MCNC: 103 MG/DL
HBA1C MFR BLD HPLC: 5.9 %
POTASSIUM SERPL-SCNC: 4.8 MMOL/L
SODIUM SERPL-SCNC: 141 MMOL/L
TRYPTASE: 3 UG/L

## 2025-09-16 LAB
ALP BLD-CCNC: 150 IU/L
ALP BONE CFR SERPL: 31 %
ALP INTEST CFR SERPL: 34 %
ALP LIVER CFR SERPL: 35 %

## 2025-09-17 ENCOUNTER — APPOINTMENT (OUTPATIENT)
Dept: INTERNAL MEDICINE | Facility: CLINIC | Age: 70
End: 2025-09-17
Payer: OTHER GOVERNMENT

## 2025-09-17 PROCEDURE — 96372 THER/PROPH/DIAG INJ SC/IM: CPT

## 2025-09-18 ENCOUNTER — APPOINTMENT (OUTPATIENT)
Dept: INTERNAL MEDICINE | Facility: CLINIC | Age: 70
End: 2025-09-18
Payer: OTHER GOVERNMENT

## 2025-09-18 DIAGNOSIS — Z13.820 ENCOUNTER FOR SCREENING FOR OSTEOPOROSIS: ICD-10-CM

## 2025-09-18 DIAGNOSIS — E53.8 DEFICIENCY OF OTHER SPECIFIED B GROUP VITAMINS: ICD-10-CM

## 2025-09-18 PROCEDURE — 36415 COLL VENOUS BLD VENIPUNCTURE: CPT

## 2025-09-19 LAB
ALBUMIN SERPL ELPH-MCNC: 3.9 G/DL
ALP BLD-CCNC: 143 U/L
ALT SERPL-CCNC: 27 U/L
ANION GAP SERPL CALC-SCNC: 14 MMOL/L
AST SERPL-CCNC: 26 U/L
BASOPHILS # BLD AUTO: 0.05 K/UL
BASOPHILS NFR BLD AUTO: 0.8 %
BILIRUB SERPL-MCNC: 0.5 MG/DL
BUN SERPL-MCNC: 12 MG/DL
CALCIUM SERPL-MCNC: 9.6 MG/DL
CHLORIDE SERPL-SCNC: 101 MMOL/L
CO2 SERPL-SCNC: 26 MMOL/L
CREAT SERPL-MCNC: 1.28 MG/DL
EGFRCR SERPLBLD CKD-EPI 2021: 60 ML/MIN/1.73M2
EOSINOPHIL # BLD AUTO: 0.16 K/UL
EOSINOPHIL NFR BLD AUTO: 2.5 %
FOLATE SERPL-MCNC: 5.6 NG/ML
GLUCOSE SERPL-MCNC: 130 MG/DL
HCT VFR BLD CALC: 48.3 %
HGB BLD-MCNC: 15.3 G/DL
IMM GRANULOCYTES NFR BLD AUTO: 0.2 %
LYMPHOCYTES # BLD AUTO: 1.39 K/UL
LYMPHOCYTES NFR BLD AUTO: 21.6 %
MAGNESIUM SERPL-MCNC: 2 MG/DL
MAN DIFF?: NORMAL
MCHC RBC-ENTMCNC: 31 PG
MCHC RBC-ENTMCNC: 31.7 G/DL
MCV RBC AUTO: 98 FL
MONOCYTES # BLD AUTO: 0.49 K/UL
MONOCYTES NFR BLD AUTO: 7.6 %
NEUTROPHILS # BLD AUTO: 4.35 K/UL
NEUTROPHILS NFR BLD AUTO: 67.3 %
PLATELET # BLD AUTO: 236 K/UL
POTASSIUM SERPL-SCNC: 4.8 MMOL/L
PROT SERPL-MCNC: 6.6 G/DL
RBC # BLD: 4.93 M/UL
RBC # FLD: 14.3 %
SODIUM SERPL-SCNC: 140 MMOL/L
VIT B12 SERPL-MCNC: >2000 PG/ML
WBC # FLD AUTO: 6.45 K/UL